# Patient Record
Sex: MALE | Race: WHITE | NOT HISPANIC OR LATINO
[De-identification: names, ages, dates, MRNs, and addresses within clinical notes are randomized per-mention and may not be internally consistent; named-entity substitution may affect disease eponyms.]

---

## 2020-09-29 ENCOUNTER — APPOINTMENT (OUTPATIENT)
Dept: OTOLARYNGOLOGY | Facility: CLINIC | Age: 55
End: 2020-09-29

## 2020-12-20 ENCOUNTER — EMERGENCY (EMERGENCY)
Facility: HOSPITAL | Age: 55
LOS: 0 days | Discharge: HOME | End: 2020-12-20
Attending: STUDENT IN AN ORGANIZED HEALTH CARE EDUCATION/TRAINING PROGRAM
Payer: COMMERCIAL

## 2020-12-20 VITALS
HEART RATE: 77 BPM | OXYGEN SATURATION: 99 % | TEMPERATURE: 98 F | SYSTOLIC BLOOD PRESSURE: 143 MMHG | RESPIRATION RATE: 16 BRPM | DIASTOLIC BLOOD PRESSURE: 66 MMHG

## 2020-12-20 VITALS — WEIGHT: 195.11 LBS

## 2020-12-20 DIAGNOSIS — S32.008A OTHER FRACTURE OF UNSPECIFIED LUMBAR VERTEBRA, INITIAL ENCOUNTER FOR CLOSED FRACTURE: ICD-10-CM
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
## 2020-12-20 DIAGNOSIS — I10 ESSENTIAL (PRIMARY) HYPERTENSION: ICD-10-CM

## 2020-12-20 DIAGNOSIS — S22.31XA FRACTURE OF ONE RIB, RIGHT SIDE, INITIAL ENCOUNTER FOR CLOSED FRACTURE: ICD-10-CM
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
## 2020-12-20 DIAGNOSIS — Z23 ENCOUNTER FOR IMMUNIZATION: ICD-10-CM

## 2020-12-20 DIAGNOSIS — E78.5 HYPERLIPIDEMIA, UNSPECIFIED: ICD-10-CM

## 2020-12-20 DIAGNOSIS — R07.9 CHEST PAIN, UNSPECIFIED: ICD-10-CM

## 2020-12-20 LAB
ALBUMIN SERPL ELPH-MCNC: 3.7 G/DL — SIGNIFICANT CHANGE UP (ref 3.3–5)
ALP SERPL-CCNC: 70 U/L — SIGNIFICANT CHANGE UP (ref 40–120)
ALT FLD-CCNC: 27 U/L — SIGNIFICANT CHANGE UP (ref 12–78)
ANION GAP SERPL CALC-SCNC: 3 MMOL/L — LOW (ref 5–17)
APTT BLD: 31.3 SEC — SIGNIFICANT CHANGE UP (ref 27.5–35.5)
AST SERPL-CCNC: 25 U/L — SIGNIFICANT CHANGE UP (ref 15–37)
BASOPHILS # BLD AUTO: 0.03 K/UL — SIGNIFICANT CHANGE UP (ref 0–0.2)
BASOPHILS NFR BLD AUTO: 0.3 % — SIGNIFICANT CHANGE UP (ref 0–2)
BILIRUB SERPL-MCNC: 0.3 MG/DL — SIGNIFICANT CHANGE UP (ref 0.2–1.2)
BUN SERPL-MCNC: 15 MG/DL — SIGNIFICANT CHANGE UP (ref 7–23)
CALCIUM SERPL-MCNC: 9 MG/DL — SIGNIFICANT CHANGE UP (ref 8.5–10.1)
CHLORIDE SERPL-SCNC: 107 MMOL/L — SIGNIFICANT CHANGE UP (ref 96–108)
CO2 SERPL-SCNC: 30 MMOL/L — SIGNIFICANT CHANGE UP (ref 22–31)
CREAT SERPL-MCNC: 0.8 MG/DL — SIGNIFICANT CHANGE UP (ref 0.5–1.3)
EOSINOPHIL # BLD AUTO: 0.03 K/UL — SIGNIFICANT CHANGE UP (ref 0–0.5)
EOSINOPHIL NFR BLD AUTO: 0.3 % — SIGNIFICANT CHANGE UP (ref 0–6)
GLUCOSE SERPL-MCNC: 139 MG/DL — HIGH (ref 70–99)
HCT VFR BLD CALC: 44.2 % — SIGNIFICANT CHANGE UP (ref 39–50)
HGB BLD-MCNC: 15.2 G/DL — SIGNIFICANT CHANGE UP (ref 13–17)
IMM GRANULOCYTES NFR BLD AUTO: 0.4 % — SIGNIFICANT CHANGE UP (ref 0–1.5)
INR BLD: 1.12 RATIO — SIGNIFICANT CHANGE UP (ref 0.88–1.16)
LYMPHOCYTES # BLD AUTO: 1.56 K/UL — SIGNIFICANT CHANGE UP (ref 1–3.3)
LYMPHOCYTES # BLD AUTO: 16.2 % — SIGNIFICANT CHANGE UP (ref 13–44)
MCHC RBC-ENTMCNC: 31 PG — SIGNIFICANT CHANGE UP (ref 27–34)
MCHC RBC-ENTMCNC: 34.4 GM/DL — SIGNIFICANT CHANGE UP (ref 32–36)
MCV RBC AUTO: 90 FL — SIGNIFICANT CHANGE UP (ref 80–100)
MONOCYTES # BLD AUTO: 0.88 K/UL — SIGNIFICANT CHANGE UP (ref 0–0.9)
MONOCYTES NFR BLD AUTO: 9.1 % — SIGNIFICANT CHANGE UP (ref 2–14)
NEUTROPHILS # BLD AUTO: 7.08 K/UL — SIGNIFICANT CHANGE UP (ref 1.8–7.4)
NEUTROPHILS NFR BLD AUTO: 73.7 % — SIGNIFICANT CHANGE UP (ref 43–77)
PLATELET # BLD AUTO: 230 K/UL — SIGNIFICANT CHANGE UP (ref 150–400)
POTASSIUM SERPL-MCNC: 4.1 MMOL/L — SIGNIFICANT CHANGE UP (ref 3.5–5.3)
POTASSIUM SERPL-SCNC: 4.1 MMOL/L — SIGNIFICANT CHANGE UP (ref 3.5–5.3)
PROT SERPL-MCNC: 7.6 GM/DL — SIGNIFICANT CHANGE UP (ref 6–8.3)
PROTHROM AB SERPL-ACNC: 13 SEC — SIGNIFICANT CHANGE UP (ref 10.6–13.6)
RBC # BLD: 4.91 M/UL — SIGNIFICANT CHANGE UP (ref 4.2–5.8)
RBC # FLD: 12.9 % — SIGNIFICANT CHANGE UP (ref 10.3–14.5)
SARS-COV-2 RNA SPEC QL NAA+PROBE: SIGNIFICANT CHANGE UP
SODIUM SERPL-SCNC: 140 MMOL/L — SIGNIFICANT CHANGE UP (ref 135–145)
WBC # BLD: 9.62 K/UL — SIGNIFICANT CHANGE UP (ref 3.8–10.5)
WBC # FLD AUTO: 9.62 K/UL — SIGNIFICANT CHANGE UP (ref 3.8–10.5)

## 2020-12-20 PROCEDURE — 99285 EMERGENCY DEPT VISIT HI MDM: CPT

## 2020-12-20 PROCEDURE — 86850 RBC ANTIBODY SCREEN: CPT

## 2020-12-20 PROCEDURE — 85610 PROTHROMBIN TIME: CPT

## 2020-12-20 PROCEDURE — 73080 X-RAY EXAM OF ELBOW: CPT | Mod: RT

## 2020-12-20 PROCEDURE — 85730 THROMBOPLASTIN TIME PARTIAL: CPT

## 2020-12-20 PROCEDURE — 74176 CT ABD & PELVIS W/O CONTRAST: CPT

## 2020-12-20 PROCEDURE — 73090 X-RAY EXAM OF FOREARM: CPT | Mod: 26,RT

## 2020-12-20 PROCEDURE — 36415 COLL VENOUS BLD VENIPUNCTURE: CPT

## 2020-12-20 PROCEDURE — 70450 CT HEAD/BRAIN W/O DYE: CPT | Mod: 26

## 2020-12-20 PROCEDURE — 85025 COMPLETE CBC W/AUTO DIFF WBC: CPT

## 2020-12-20 PROCEDURE — U0003: CPT

## 2020-12-20 PROCEDURE — 99244 OFF/OP CNSLTJ NEW/EST MOD 40: CPT

## 2020-12-20 PROCEDURE — 73080 X-RAY EXAM OF ELBOW: CPT | Mod: 26,RT

## 2020-12-20 PROCEDURE — 72125 CT NECK SPINE W/O DYE: CPT

## 2020-12-20 PROCEDURE — 86901 BLOOD TYPING SEROLOGIC RH(D): CPT

## 2020-12-20 PROCEDURE — 93010 ELECTROCARDIOGRAM REPORT: CPT

## 2020-12-20 PROCEDURE — 90715 TDAP VACCINE 7 YRS/> IM: CPT

## 2020-12-20 PROCEDURE — 73060 X-RAY EXAM OF HUMERUS: CPT | Mod: 26,RT

## 2020-12-20 PROCEDURE — 71250 CT THORAX DX C-: CPT

## 2020-12-20 PROCEDURE — 93005 ELECTROCARDIOGRAM TRACING: CPT

## 2020-12-20 PROCEDURE — 99284 EMERGENCY DEPT VISIT MOD MDM: CPT | Mod: 25

## 2020-12-20 PROCEDURE — 70450 CT HEAD/BRAIN W/O DYE: CPT

## 2020-12-20 PROCEDURE — 74176 CT ABD & PELVIS W/O CONTRAST: CPT | Mod: 26

## 2020-12-20 PROCEDURE — 73090 X-RAY EXAM OF FOREARM: CPT | Mod: RT

## 2020-12-20 PROCEDURE — 73060 X-RAY EXAM OF HUMERUS: CPT | Mod: RT

## 2020-12-20 PROCEDURE — 71250 CT THORAX DX C-: CPT | Mod: 26

## 2020-12-20 PROCEDURE — 90471 IMMUNIZATION ADMIN: CPT

## 2020-12-20 PROCEDURE — 86900 BLOOD TYPING SEROLOGIC ABO: CPT

## 2020-12-20 PROCEDURE — 80053 COMPREHEN METABOLIC PANEL: CPT

## 2020-12-20 PROCEDURE — 72125 CT NECK SPINE W/O DYE: CPT | Mod: 26

## 2020-12-20 RX ORDER — LIDOCAINE 4 G/100G
1 CREAM TOPICAL
Qty: 10 | Refills: 0
Start: 2020-12-20 | End: 2020-12-29

## 2020-12-20 RX ORDER — TETANUS TOXOID, REDUCED DIPHTHERIA TOXOID AND ACELLULAR PERTUSSIS VACCINE, ADSORBED 5; 2.5; 8; 8; 2.5 [IU]/.5ML; [IU]/.5ML; UG/.5ML; UG/.5ML; UG/.5ML
0.5 SUSPENSION INTRAMUSCULAR ONCE
Refills: 0 | Status: COMPLETED | OUTPATIENT
Start: 2020-12-20 | End: 2020-12-20

## 2020-12-20 RX ORDER — OXYCODONE AND ACETAMINOPHEN 5; 325 MG/1; MG/1
1 TABLET ORAL
Qty: 8 | Refills: 0
Start: 2020-12-20 | End: 2020-12-21

## 2020-12-20 RX ORDER — OXYCODONE HYDROCHLORIDE 5 MG/1
5 TABLET ORAL ONCE
Refills: 0 | Status: DISCONTINUED | OUTPATIENT
Start: 2020-12-20 | End: 2020-12-20

## 2020-12-20 RX ORDER — ACETAMINOPHEN 500 MG
975 TABLET ORAL ONCE
Refills: 0 | Status: COMPLETED | OUTPATIENT
Start: 2020-12-20 | End: 2020-12-20

## 2020-12-20 RX ADMIN — OXYCODONE HYDROCHLORIDE 5 MILLIGRAM(S): 5 TABLET ORAL at 16:15

## 2020-12-20 RX ADMIN — TETANUS TOXOID, REDUCED DIPHTHERIA TOXOID AND ACELLULAR PERTUSSIS VACCINE, ADSORBED 0.5 MILLILITER(S): 5; 2.5; 8; 8; 2.5 SUSPENSION INTRAMUSCULAR at 14:18

## 2020-12-20 RX ADMIN — Medication 975 MILLIGRAM(S): at 10:09

## 2020-12-20 RX ADMIN — Medication 975 MILLIGRAM(S): at 08:53

## 2020-12-20 NOTE — ED ADULT NURSE NOTE - CHIEF COMPLAINT QUOTE
Pt complains of right-sided posterior rib and right arm pain after trip and fall down 12 steps within past hour.  Pt denies head injury, denies LOC, takes no anticoagulants.  No acute distress on arrival.

## 2020-12-20 NOTE — ED PROVIDER NOTE - PATIENT PORTAL LINK FT
You can access the FollowMyHealth Patient Portal offered by [facility] by registering at the following website: [email or web address]. By joining [employer]’s FollowMyHealth portal, you will also be able to view your health information using other applications (apps) compatible with our system.

## 2020-12-20 NOTE — CONSULT NOTE ADULT - SUBJECTIVE AND OBJECTIVE BOX
GCS 15    54 yo male s/p mechanical fall down stairs. Patient slide down stairs on back and braced with arms. Denies LOC. Patient admits to pain along the right side of chest and lower back. Pain controlled in ED with Tylenol. Denies any SOB. Patient is ambulatory. Denies any headaches, weakness, or loss of sensation to lower extremities.     PMH: DM  PSH: Right finger  NKDA    Vital Signs Last 24 Hrs  T(C): 36.9 (20 Dec 2020 11:38), Max: 37 (20 Dec 2020 08:37)  T(F): 98.4 (20 Dec 2020 11:38), Max: 98.6 (20 Dec 2020 08:37)  HR: 75 (20 Dec 2020 11:38) (75 - 88)  BP: 139/73 (20 Dec 2020 11:38) (139/73 - 161/69)  BP(mean): 88 (20 Dec 2020 11:38) (88 - 89)  RR: 16 (20 Dec 2020 11:38) (16 - 20)  SpO2: 100% (20 Dec 2020 11:38) (98% - 100%)    Gen: AAOx3 in NAD  HEENT: NCAT  Cervical: No cervical tenderness, FROM  Chest: Right lateral chest pain over 11th rib, no gross injuries  Abd: Soft, NTND, no injuries  Back: No stepoffs or injuries noted  Ext: FROM of all extremities.   Vasc: Palpable PT and radial pulses distally                          15.2   9.62  )-----------( 230      ( 20 Dec 2020 11:03 )             44.2     Imaging:  < from: CT Abdomen and Pelvis No Cont (12.20.20 @ 10:05) >  IMPRESSION: Right transverse process fractures of L1-L4. Displaced right posterior 11th rib fracture noted  No evidence of pneumothorax or visceral injury  Atherosclerotic calcification more than expected for the stated age    < end of copied text >  < from: CT Cervical Spine No Cont (12.20.20 @ 09:33) >  IMPRESSION:    There is no skull fracture, intracranial hemorrhage or mass effect.    Straightening of the usual cervical lordosis without fracture or malalignment.    < end of copied text >  
This is a 56 yo male s/p mechanical fall down stairs. Patient slid down stairs on back and braced with arms. Denies LOC. Patient admits to pain along the right side of chest and lower back.  the lower back pain does not radiate down his legs and there is no associated numbness or weakness of extremities.  Pain controlled in ED with Tylenol. Denies any SOB. Patient is ambulatory. Denies any headaches, weakness, or loss of sensation to lower extremities.     Vital Signs Last 24 Hrs  T(C): 36.8 (20 Dec 2020 15:44), Max: 37 (20 Dec 2020 08:37)  T(F): 98.2 (20 Dec 2020 15:44), Max: 98.6 (20 Dec 2020 08:37)  HR: 77 (20 Dec 2020 15:44) (75 - 88)  BP: 143/66 (20 Dec 2020 15:44) (139/73 - 161/69)  BP(mean): 88 (20 Dec 2020 11:38) (88 - 89)  RR: 16 (20 Dec 2020 15:44) (16 - 20)  SpO2: 99% (20 Dec 2020 15:44) (98% - 100%)                        15.2   9.62  )-----------( 230      ( 20 Dec 2020 11:03 )             44.2   12-20    140  |  107  |  15  ----------------------------<  139<H>  4.1   |  30  |  0.80    Ca    9.0      20 Dec 2020 11:03    TPro  7.6  /  Alb  3.7  /  TBili  0.3  /  DBili  x   /  AST  25  /  ALT  27  /  AlkPhos  70  12-20    Home Medications:  none  PAST MEDICAL & SURGICAL HISTORY:  denies    < from: CT Chest No Cont (12.20.20 @ 10:05) >    EXAM:  CT ABDOMEN AND PELVIS                          EXAM:  CT CHEST                            PROCEDURE DATE:  12/20/2020          INTERPRETATION:  CLINICAL INFORMATION: Right-sided rib and back pain status post fall    COMPARISON: None.    PROCEDURE:  CT of the Chest, Abdomen and Pelvis was performed without intravenous contrast.  Intravenous contrast: None.  Oral contrast: None.  Sagittal and coronal reformats were performed.    FINDINGS:  CHEST:  LUNGS AND LARGE AIRWAYS: Patent central airways. No pulmonary nodules.  PLEURA: No pleural effusion.  VESSELS: Coronary artery calcification is appreciated  HEART: Heart size is normal. No pericardial effusion.  MEDIASTINUM AND BERNARD: No lymphadenopathy.  CHEST WALL AND LOWER NECK: Within normal limits.    ABDOMEN AND PELVIS:  LIVER: Within normal limits.  BILE DUCTS: Normal caliber.  GALLBLADDER: Within normal limits.  SPLEEN: Within normal limits.  PANCREAS: Within normal limits.  ADRENALS: Within normal limits.  KIDNEYS/URETERS: Within normal limits.    BLADDER: Within normal limits.  REPRODUCTIVE ORGANS: Prostate within normal limits.    BOWEL: No bowel obstruction. Appendix is normal.  PERITONEUM: No ascites.  VESSELS: Atherosclerotic changes.  RETROPERITONEUM/LYMPH NODES: No lymphadenopathy.  ABDOMINAL WALL: Within normal limits.  BONES: Mildly displaced fracture of the right transverse processes of L1-L4. Displaced right 11th posterior rib fracture is also seen    IMPRESSION: Right transverse process fractures of L1-L4. Displaced right posterior 11th rib fracture noted  No evidence of pneumothorax or visceral injury  Atherosclerotic calcification more than expected for the stated age    JACOB NEGRETE MD; Attending Radiologist  This document has been electronically signed. Dec 20 2020 10:41AM    < end of copied text >    Physical Exam:  Constitutional: Awake / alert  HEENT: PERRLA, EOMI  Neck: Supple  Respiratory: Breath sounds are clear bilaterally  Cardiovascular: S1 and S2, regular rhythm  Gastrointestinal: Soft, NT/ND  Extremities:  no edema  +point tenderness to lower back  Musculoskeletal: no joint swelling/tenderness, no abnormal movements  Skin: No rashes    Neurological Exam:  HF: A x O x 3, appropriately interactive, normal affect, speech fluent, no aphasia or paraphasic errors. Naming /repetition intact   CN: PERRL, EOMI, VFF, facial sensation normal, no NLFD, tongue midline  Motor: No pronator drift, Strength 5/5 in all 4 ext, normal bulk and tone, no tremor, rigidity or bradykinesia  Sens: Intact to light touch  Reflexes: Symmetric and normal, downgoing toes b/l  Coord:  No FNFA, dysmetria, MONICA intact   Gait/Balance: Cannot test due to pain  GCS 15

## 2020-12-20 NOTE — ED PROVIDER NOTE - CARE PROVIDER_API CALL
Eliel Denton  NEUROSURGERY  [address], [address] [place] [address]  Phone: [phone]  Fax: [phone]  Follow Up Time:

## 2020-12-20 NOTE — ED PROVIDER NOTE - NSFOLLOWUPINSTRUCTIONS_ED_ALL_ED_FT
Transverse Process Fracture       Bones of the spine (vertebrae) have portions that extend off to either side of the spine. These portions of bone are called transverse processes. A transverse process fracture, which is also called a rotation spine fracture, is a break in a transverse process.      What are the causes?  This condition may be caused by:  •A fall from a great height.      •A car accident.      •A sports injury.      •A gunshot wound.      •A hard, direct hit to the back.      This kind of fracture often results from a sudden and severe bending of the spine to one side. Depending on the cause of the fracture, one or more bones may be affected.      What increases the risk?  You are more likely to develop this condition if:  •You have thinning and loss of density in the bones (osteoporosis).      •You play a contact sport.        What are the signs or symptoms?  The main symptom of this condition is back pain. The pain may:  •Be felt on the side of the spine (flank) where the fracture is.      •Get worse when you move or take a deep breath.        How is this diagnosed?  This condition may be diagnosed based on:  •Your symptoms.      •Your medical history.      •A physical exam.    You may also have other tests, including:  •X-rays.      •A CT scan.      •MRI.        How is this treated?  Most transverse process fractures heal on their own with time and rest. Treatment may involve supportive care, such as:  •Limiting activity.    •Medicines, such as:  •Pain medicine.      •Muscle-relaxing medicine.        •Physical therapy.      •A neck or back brace.        Follow these instructions at home:    If you have a brace:     •Wear the neck or back brace as told by your health care provider. Remove it only as told by your health care provider.      •Keep the brace clean.     •If the brace is not waterproof:   •Do not let it get wet.      •Cover it with a watertight covering when you take a bath or a shower.          Managing pain, stiffness, and swelling    •If directed, put ice on the injured area:  •If you have a removable brace, remove it as told by your health care provider.      •Put ice in a plastic bag.      •Place a towel between your skin and the bag.      •Leave the ice on for 20 minutes, 2–3 times a day.        Medicines     •Take over-the-counter and prescription medicines only as told by your health care provider.      • Do not drive or use heavy machinery while taking prescription pain medicine.    •If you are taking prescription pain medicine, take actions to prevent or treat constipation. Your health care provider may recommend that you:  •Drink enough fluid to keep your urine pale yellow.      •Eat foods that are high in fiber, such as fresh fruits and vegetables, whole grains, and beans.      •Limit foods that are high in fat and processed sugars, such as fried or sweet foods.      •Take an over-the-counter or prescription medicine for constipation.        Activity   •Stay in bed (on bed rest) only as directed by your health care provider.  •Avoid being in bed for a long time without moving. Get up to take short walks every 1–2 hours. This is important to improve blood flow and breathing. Ask for help if you feel weak or unsteady.        •Return to your normal activities when your health care provider says it is okay. Ask if there are any activities that you should not do.      •Do physical therapy exercises as recommended by your health care provider.      General instructions     • Do not use any products that contain nicotine or tobacco, such as cigarettes and e-cigarettes. These can delay bone healing. If you need help quitting, ask your health care provider.      •Keep all follow-up visits as told by your health care provider. This is important. Visits can help to prevent permanent injury, disability, and long-lasting (chronic) pain.        Contact a health care provider if:    •You have a fever.      •You develop a cough that makes your pain worse.      •Your pain medicine is not helping.      •Your pain does not get better over time.      •You cannot return to your normal activities as planned or expected.        Get help right away if:    •Your pain is very bad and it suddenly gets worse.      •You are unable to move any body part (paralysis) that is below the level of your injury.      •You have numbness, tingling, or weakness in any body part that is below the level of your injury.      •You cannot control your bladder or bowels.        Summary    •A transverse process fracture is a break in the portion of the bone that extends to the side of the spine.      •Most transverse process fractures heal on their own with time and rest.      •You may also have supportive treatments such as a back brace, pain medicines, and physical therapy.      •Keep all follow-up visits. This is important and will help to prevent permanent injury, disability, and long-lasting (chronic) pain.      This information is not intended to replace advice given to you by your health care provider. Make sure you discuss any questions you have with your health care provider.      Rib Fracture    WHAT YOU NEED TO KNOW:    A rib fracture is a crack or break in a rib bone. Rib fractures usually heal within 6 weeks. You should be able to return to your usual activities before that time.     Rib Cage         DISCHARGE INSTRUCTIONS:    Call your local emergency number (911 in the ) if:   •You have trouble breathing.       •You have new or increased pain.      Seek care immediately if:   •Your pain does not get better, even after treatment.      •You have a fever or a cough.       Call your doctor if:   •You have questions or concerns about your condition or care.           Medicines: You may need any of the following:   •NSAIDs, such as ibuprofen, help decrease swelling, pain, and fever. This medicine is available with or without a doctor's order. NSAIDs can cause stomach bleeding or kidney problems in certain people. If you take blood thinner medicine, always ask your healthcare provider if NSAIDs are safe for you. Always read the medicine label and follow directions.      •Prescription pain medicine may be given. Ask your healthcare provider how to take this medicine safely. Some prescription pain medicines contain acetaminophen. Do not take other medicines that contain acetaminophen without talking to your healthcare provider. Too much acetaminophen may cause liver damage. Prescription pain medicine may cause constipation. Ask your healthcare provider how to prevent or treat constipation.       •Take your medicine as directed. Contact your healthcare provider if you think your medicine is not helping or if you have side effects. Tell him or her if you are allergic to any medicine. Keep a list of the medicines, vitamins, and herbs you take. Include the amounts, and when and why you take them. Bring the list or the pill bottles to follow-up visits. Carry your medicine list with you in case of an emergency.      Self-care:   •Take deep breaths and cough 10 times each hour. This will decrease your risk for a lung infection. Hug a pillow on your injured side to decrease pain while you take deep breaths. Take a deep breath and hold it for as long as you can. Let the air out and then cough. Deep breaths help open your airway. You may be given an incentive spirometer to help you take deep breaths. Put the plastic piece in your mouth and take a slow, deep breath, then let the air out and cough. Repeat these steps 10 times every hour.       •Rest and limit activity as directed. Do not pull, push, or lift objects. Start to do more as your pain decreases. Ask your healthcare provider how much activity you can do.      •Apply ice on your chest near your fractured rib for 15 to 20 minutes every hour or as directed. Use an ice pack, or put crushed ice in a plastic bag. Cover it with a towel. Ice helps prevent tissue damage and decreases swelling and pain.      Follow up with your doctor as directed: Write down your questions so you remember to ask them during your visits.

## 2020-12-20 NOTE — ED PROVIDER NOTE - SKIN, MLM
Skin normal color for race, warm, dry. No evidence of rash. +abrasions to volar aspect of R forearm, full ROM. +abrasions to volar aspect of L wrist.

## 2020-12-20 NOTE — ED PROVIDER NOTE - CARE PLAN
Principal Discharge DX:	Fractures, multiple   Principal Discharge DX:	Rib fracture  Secondary Diagnosis:	Back injury

## 2020-12-20 NOTE — ED ADULT NURSE NOTE - OBJECTIVE STATEMENT
Pt complains of right-sided posterior rib and right arm pain after trip and fall down 12 steps within past hour.  pt states he had socks on and slipped backward. Pt denies head injury, denies LOC, takes no anticoagulants.

## 2020-12-20 NOTE — ED ADULT TRIAGE NOTE - CHIEF COMPLAINT QUOTE
Pt complains of right-sided posterior rib and right arm pain after trip and fall down 12 steps.  Pt denies head injury, denies LOC, takes no anticoagulants. Pt complains of right-sided posterior rib and right arm pain after trip and fall down 12 steps within past hour.  Pt denies head injury, denies LOC, takes no anticoagulants.  No acute distress on arrival.

## 2020-12-20 NOTE — ED ADULT NURSE REASSESSMENT NOTE - NS ED NURSE REASSESS COMMENT FT1
pt received in bed, alert and oriented x4. Pt resting comfortably in bed at this time, pending results of CT scan/xray at this time. Safety maintained

## 2020-12-20 NOTE — ED PROVIDER NOTE - MUSCULOSKELETAL, MLM
Spine appears normal. Range of motion is not limited. +TTP at the R posterior lateral thoracic area and R flank. No abrasions or ecchymosis noted.

## 2020-12-20 NOTE — ED PROVIDER NOTE - CLINICAL SUMMARY MEDICAL DECISION MAKING FREE TEXT BOX
55 M presents to the ED s/p mechanical fall. Plan: EKG, XR R elbow, forearm and humerus, CT head, C-spine, A/P, pain control and reevaluate.

## 2020-12-20 NOTE — ED PROVIDER NOTE - OBJECTIVE STATEMENT
56 y/o M with PMHx of HTN, HLD presenting to the ED s/p mechanical fall down stairs this AM. Patient reports that he was at the top of the stairs wearing socks when he slipped, falling down approx. x12 wooden steps onto his R side. No head injury, no LOC. Patient is not on any blood thinners.  Currently c/o RUE pain, localized at R elbow, R upper back pain and chest wall pain. Patient was helped up by wife and came to the ED for further evaluation. Pt was not on the ground for extended period of time. No medications taken PTA. Denies any other pain/complaints at this time.

## 2020-12-20 NOTE — ED PROVIDER NOTE - PROGRESS NOTE DETAILS
Sonia DURAND for ED attending, Dr. Jackson: Spoke to Dr. Denton service on call for ortho spine, pt with transverse process fx L1-L4, will come down to evaluate patient. Requesting trauma consult at this time. Sonia DURAND for ED attending, Dr. Jackson: Spoke to Dr. Hughes on call for trauma, will come down to evaluate patient. Manuel ORTIZ: cleared by trauma; cleared by NS team; to f/u with Dr. Denton as outpatient; strict return precautions given.

## 2020-12-20 NOTE — CONSULT NOTE ADULT - ASSESSMENT
56 yo male s/p fall with multiple lumbar transverse process fractures and right 11th rib fracture.    -Patient is cleared from trauma surgery for discharge home pending Neurosurgery clearance of spine  -All imaging reviewed  -Pain control with oral medications and lidocaine patch  -Incentive spirometer use  -Plan discussed with patient understanding of plan    Discussed with trauma surgery attending, Dr. Hughes 
AP  transverse process fractures of lumbar spine  No acute neurosurgical intervention at this time  recommend conservative measures, multi modal pain regimen  Dr Denton discussed plan w patient who understands and agrees  patient is cleared from neurosurgery standpoint, if cleared by trauma  plan also  ED team

## 2020-12-21 ENCOUNTER — TRANSCRIPTION ENCOUNTER (OUTPATIENT)
Age: 55
End: 2020-12-21

## 2021-08-09 ENCOUNTER — NON-APPOINTMENT (OUTPATIENT)
Age: 56
End: 2021-08-09

## 2021-08-09 PROBLEM — E78.5 HYPERLIPIDEMIA, UNSPECIFIED: Chronic | Status: ACTIVE | Noted: 2020-12-20

## 2021-08-09 PROBLEM — I10 ESSENTIAL (PRIMARY) HYPERTENSION: Chronic | Status: ACTIVE | Noted: 2020-12-20

## 2021-09-28 ENCOUNTER — APPOINTMENT (OUTPATIENT)
Dept: DERMATOLOGY | Facility: CLINIC | Age: 56
End: 2021-09-28
Payer: COMMERCIAL

## 2021-09-28 ENCOUNTER — NON-APPOINTMENT (OUTPATIENT)
Age: 56
End: 2021-09-28

## 2021-09-28 DIAGNOSIS — Z12.83 ENCOUNTER FOR SCREENING FOR MALIGNANT NEOPLASM OF SKIN: ICD-10-CM

## 2021-09-28 DIAGNOSIS — Z86.39 PERSONAL HISTORY OF OTHER ENDOCRINE, NUTRITIONAL AND METABOLIC DISEASE: ICD-10-CM

## 2021-09-28 DIAGNOSIS — L81.4 OTHER MELANIN HYPERPIGMENTATION: ICD-10-CM

## 2021-09-28 DIAGNOSIS — L57.0 ACTINIC KERATOSIS: ICD-10-CM

## 2021-09-28 DIAGNOSIS — D22.9 MELANOCYTIC NEVI, UNSPECIFIED: ICD-10-CM

## 2021-09-28 PROCEDURE — 11103 TANGNTL BX SKIN EA SEP/ADDL: CPT

## 2021-09-28 PROCEDURE — 11102 TANGNTL BX SKIN SINGLE LES: CPT

## 2021-09-28 PROCEDURE — 99203 OFFICE O/P NEW LOW 30 MIN: CPT | Mod: 25

## 2021-09-28 NOTE — PHYSICAL EXAM
[FreeTextEntry3] : AAOx3, pleasant, NAD, no visual lymphadenopathy\par hair, scalp, face, nose, eyelids, ears, lips, oropharynx, neck, chest, abdomen, back, right arm, left arm, nails, and hands examined with all normal findings,\par pertinent findings include:\par \par multiple benign nevi and lentigines\par scaling erythematous papule on nasal bridge\par + inflamed, waxy, keratotic papule on left forearm and lower back

## 2021-09-28 NOTE — ASSESSMENT
[FreeTextEntry1] : 1) benign findings as above- education\par \par 2) Shave bx location x2; left forearm, lower back\par diagnosis: r/o ISK\par  \par Shave biopsy performed today over above location, risks and benefits discussed including incomplete removal, not enough tissue for diagnosis scarring and infection, informed consent obtained, pictures taken,  cleaned with alcohol and anesthetized with 1%lido+epi, 0.3 cc total, hemostasis obtained with monsels, vaseline and bandaid placed, tolerated well, wound care reviewed, specimen sent to pathology.\par \par 3) Actinic keratoses as above\par -Treated with cryotherapy x 2, side effects discussed including erythema and scarring, blister formation expected by patient, total freeze time 4 seconds. Wound care reviewed withpatient. Risk of hypo/hyperpigmentation reviewed with patient, and possible need for re-treatment and / or future biopsy also reviewed with patient\par - total # treated- 1

## 2021-09-28 NOTE — HISTORY OF PRESENT ILLNESS
[FreeTextEntry1] : skin check [de-identified] : 56 year old male here for skin check. new growths which have been enlarging and itching on forearm and back.\par

## 2021-10-04 LAB — CORE LAB BIOPSY: NORMAL

## 2022-09-12 ENCOUNTER — APPOINTMENT (OUTPATIENT)
Dept: DERMATOLOGY | Facility: CLINIC | Age: 57
End: 2022-09-12

## 2023-03-20 ENCOUNTER — OFFICE (OUTPATIENT)
Dept: URBAN - METROPOLITAN AREA CLINIC 102 | Facility: CLINIC | Age: 58
Setting detail: OPHTHALMOLOGY
End: 2023-03-20
Payer: COMMERCIAL

## 2023-03-20 DIAGNOSIS — Z96.1: ICD-10-CM

## 2023-03-20 DIAGNOSIS — H16.223: ICD-10-CM

## 2023-03-20 DIAGNOSIS — H18.832: ICD-10-CM

## 2023-03-20 DIAGNOSIS — E10.3513: ICD-10-CM

## 2023-03-20 DIAGNOSIS — H26.493: ICD-10-CM

## 2023-03-20 PROCEDURE — 92250 FUNDUS PHOTOGRAPHY W/I&R: CPT | Performed by: OPHTHALMOLOGY

## 2023-03-20 PROCEDURE — 92004 COMPRE OPH EXAM NEW PT 1/>: CPT | Performed by: OPHTHALMOLOGY

## 2023-03-20 ASSESSMENT — SPHEQUIV_DERIVED
OS_SPHEQUIV: -1.125
OS_SPHEQUIV: 0
OD_SPHEQUIV: 0.375
OD_SPHEQUIV: 0.125

## 2023-03-20 ASSESSMENT — KERATOMETRY
OS_AXISANGLE_DEGREES: 169
OD_K2POWER_DIOPTERS: 45.00
OS_K2POWER_DIOPTERS: 45.00
OD_AXISANGLE_DEGREES: 008
OS_K1POWER_DIOPTERS: 43.25
OD_K1POWER_DIOPTERS: 43.25

## 2023-03-20 ASSESSMENT — AXIALLENGTH_DERIVED
OS_AL: 23.8025
OD_AL: 23.2221
OS_AL: 23.3645
OD_AL: 23.3169

## 2023-03-20 ASSESSMENT — REFRACTION_MANIFEST
OS_SPHERE: -0.50
OS_CYLINDER: -1.25
OS_VA1: 20/40
OD_SPHERE: +0.75
OD_AXIS: 081
OD_CYLINDER: -1.25
OS_AXIS: 099

## 2023-03-20 ASSESSMENT — CONFRONTATIONAL VISUAL FIELD TEST (CVF)
OS_FINDINGS: FULL
OD_FINDINGS: FULL

## 2023-03-20 ASSESSMENT — REFRACTION_AUTOREFRACTION
OS_AXIS: 95
OD_AXIS: 83
OD_SPHERE: +1.00
OS_SPHERE: +0.50
OS_CYLINDER: -1.00
OD_CYLINDER: -1.25

## 2023-03-20 ASSESSMENT — REFRACTION_CURRENTRX
OS_SPHERE: +2.00
OD_OVR_VA: 20/
OS_AXIS: 0
OS_CYLINDER: SPHERE
OD_VPRISM_DIRECTION: SV
OS_VPRISM_DIRECTION: SV
OS_OVR_VA: 20/
OD_AXIS: 0
OD_SPHERE: +2.00
OD_CYLINDER: SPHERE

## 2023-03-20 ASSESSMENT — VISUAL ACUITY
OD_BCVA: 20/30
OS_BCVA: 20/25

## 2023-03-20 ASSESSMENT — TONOMETRY
OD_IOP_MMHG: 11
OS_IOP_MMHG: 11

## 2023-07-18 NOTE — ED ADULT NURSE NOTE - CHIEF COMPLAINT
The patient is a 55y Male complaining of fall down stairs. Oriented - self; Oriented - place; Oriented - time

## 2023-12-27 ENCOUNTER — EMERGENCY (EMERGENCY)
Facility: HOSPITAL | Age: 58
LOS: 0 days | Discharge: HOME | End: 2023-12-28
Attending: STUDENT IN AN ORGANIZED HEALTH CARE EDUCATION/TRAINING PROGRAM
Payer: COMMERCIAL

## 2023-12-27 VITALS
TEMPERATURE: 98 F | HEIGHT: 70 IN | SYSTOLIC BLOOD PRESSURE: 147 MMHG | RESPIRATION RATE: 18 BRPM | OXYGEN SATURATION: 97 % | HEART RATE: 79 BPM | DIASTOLIC BLOOD PRESSURE: 65 MMHG | WEIGHT: 195.11 LBS

## 2023-12-27 DIAGNOSIS — M54.50 LOW BACK PAIN, UNSPECIFIED: ICD-10-CM

## 2023-12-27 DIAGNOSIS — M54.89 OTHER DORSALGIA: ICD-10-CM

## 2023-12-27 DIAGNOSIS — E10.9 TYPE 1 DIABETES MELLITUS WITHOUT COMPLICATIONS: ICD-10-CM

## 2023-12-27 PROCEDURE — 99284 EMERGENCY DEPT VISIT MOD MDM: CPT

## 2023-12-27 PROCEDURE — 99283 EMERGENCY DEPT VISIT LOW MDM: CPT

## 2023-12-27 RX ORDER — DICLOFENAC SODIUM 75 MG/1
1 TABLET, DELAYED RELEASE ORAL
Refills: 0 | DISCHARGE
Start: 2023-12-27

## 2023-12-27 RX ORDER — METHOCARBAMOL 500 MG/1
2 TABLET, FILM COATED ORAL
Refills: 0 | DISCHARGE
Start: 2023-12-27

## 2023-12-27 NOTE — ED STATDOCS - PROGRESS NOTE DETAILS
back pain for 2 weeks. no red flags for cauda equina. ambulating. no neuro deficits. no fever. normal virals.

## 2023-12-27 NOTE — ED STATDOCS - PATIENT PORTAL LINK FT
You can access the FollowMyHealth Patient Portal offered by [facility] by registering at the following website: [email or web address]. By joining [employer]’s FollowMyHealth portal, you will also be able to view your health information using other applications (apps) compatible with our system. You can access the FollowMyHealth Patient Portal offered by [facility] by registering at the following website: [email or web address]. By joining [employer]’s FollowMyHealth portal, you will also be able to view your health information using other applications (apps) compatible with our system.

## 2023-12-27 NOTE — ED STATDOCS - NSFOLLOWUPINSTRUCTIONS_ED_ALL_ED_FT
Seek immediate medical assistance for any new or worsening symptoms. If you have issues obtaining follow up, please call: [phone] or [phone]  to obtain a doctor or specialist who takes your insurance in your area.     Take diclofenac, methocarbamol, tylenol for pain.     Follow up with SPINE. Seek immediate medical assistance for any new or worsening symptoms. If you have issues obtaining follow up, please call: [phone] or [phone]  to obtain a doctor or specialist who takes your insurance in your area.     Take diclofenac, methocarbamol, tylenol for pain.     Follow up with SPINE.

## 2023-12-27 NOTE — ED STATDOCS - OBJECTIVE STATEMENT
58-year male coming in with 2 weeks of lower back pain now radiating up to his upper back.  He feels like it is burning in nature.  He is a type I diabetic but his sugars are controlled.  No fevers chills IV drug use history urinary or bladder or bowel incontinence or saddle anesthesia.  No falls or trauma.  No weakness or numbness in the upper extremities.  No neck pain.  No headache.  Compliant with all medications.

## 2023-12-27 NOTE — ED STATDOCS - CARE PROVIDER_API CALL
Jasmeet Marte  Orthopaedic Surgery  [address] [place] [id number]  Phone: [phone]  Fax: [phone]  Follow Up Time: 1-3 Days   Jasmeet Marte  Orthopaedic Surgery  [address] [place] [id number]  Phone: [phone]  Fax: [phone]  Follow Up Time: 1-3 Days

## 2023-12-27 NOTE — ED STATDOCS - CLINICAL SUMMARY MEDICAL DECISION MAKING FREE TEXT BOX
adult male here with 2 weeks of back pain.  Was prescribed diclofenac and methocarbamol today but has only taken 1 dose of it.  No red flags or cauda equina.  Vitals are normal neuro intact.  Will DC with spine follow-up.

## 2023-12-27 NOTE — ED ADULT TRIAGE NOTE - CHIEF COMPLAINT QUOTE
Patient ambulatory to the ER c/o back pain and headache for 6 days. Patient reports going to urgent care for the pain; was prescribed diclofenac sodium and flexeril without relief. - blood thinners, - allergies. A&Ox4 and no signs and symptoms of distress. Denies any recent traumas or falls

## 2023-12-28 VITALS
HEART RATE: 89 BPM | TEMPERATURE: 98 F | DIASTOLIC BLOOD PRESSURE: 75 MMHG | OXYGEN SATURATION: 98 % | SYSTOLIC BLOOD PRESSURE: 132 MMHG | RESPIRATION RATE: 20 BRPM

## 2023-12-28 NOTE — ED ADULT NURSE NOTE - NSFALLUNIVINTERV_ED_ALL_ED
Bed/Stretcher in lowest position, wheels locked, appropriate side rails in place/Call bell, personal items and telephone in reach/Instruct patient to call for assistance before getting out of bed/chair/stretcher/Non-slip footwear applied when patient is off stretcher/[place] to call system/Physically safe environment - no spills, clutter or unnecessary equipment/Purposeful proactive rounding/Room/bathroom lighting operational, light cord in reach Bed/Stretcher in lowest position, wheels locked, appropriate side rails in place/Call bell, personal items and telephone in reach/Instruct patient to call for assistance before getting out of bed/chair/stretcher/Non-slip footwear applied when patient is off stretcher/[place] to call system/Physically safe environment - no spills, clutter or unnecessary equipment/Purposeful proactive rounding/Room/bathroom lighting operational, light cord in reach

## 2023-12-28 NOTE — ED ADULT NURSE NOTE - OBJECTIVE STATEMENT
Ambulatory to ER with c/o back pain. A & ox4, respirations even and unlabored on room air. Await MD bey.

## 2024-01-02 ENCOUNTER — INPATIENT (INPATIENT)
Facility: HOSPITAL | Age: 59
LOS: 5 days | Discharge: HOME HEALTH | DRG: 867 | End: 2024-01-08
Attending: STUDENT IN AN ORGANIZED HEALTH CARE EDUCATION/TRAINING PROGRAM | Admitting: INTERNAL MEDICINE
Payer: COMMERCIAL

## 2024-01-02 VITALS — WEIGHT: 195.11 LBS | HEIGHT: 70 IN

## 2024-01-02 DIAGNOSIS — Z96.41 PRESENCE OF INSULIN PUMP (EXTERNAL) (INTERNAL): ICD-10-CM

## 2024-01-02 DIAGNOSIS — Z79.4 LONG TERM (CURRENT) USE OF INSULIN: ICD-10-CM

## 2024-01-02 DIAGNOSIS — I10 ESSENTIAL (PRIMARY) HYPERTENSION: ICD-10-CM

## 2024-01-02 DIAGNOSIS — A69.21 MENINGITIS DUE TO LYME DISEASE: ICD-10-CM

## 2024-01-02 DIAGNOSIS — E10.9 TYPE 1 DIABETES MELLITUS WITHOUT COMPLICATIONS: ICD-10-CM

## 2024-01-02 DIAGNOSIS — M54.2 CERVICALGIA: ICD-10-CM

## 2024-01-02 DIAGNOSIS — S05.01XA INJURY OF CONJUNCTIVA AND CORNEAL ABRASION WITHOUT FOREIGN BODY, RIGHT EYE, INITIAL ENCOUNTER: ICD-10-CM

## 2024-01-02 DIAGNOSIS — G89.29 OTHER CHRONIC PAIN: ICD-10-CM
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
## 2024-01-02 DIAGNOSIS — S05.02XA INJURY OF CONJUNCTIVA AND CORNEAL ABRASION WITHOUT FOREIGN BODY, LEFT EYE, INITIAL ENCOUNTER: ICD-10-CM

## 2024-01-02 DIAGNOSIS — S69.91XA UNSPECIFIED INJURY OF RIGHT WRIST, HAND AND FINGER(S), INITIAL ENCOUNTER: Chronic | ICD-10-CM

## 2024-01-02 DIAGNOSIS — R47.1 DYSARTHRIA AND ANARTHRIA: ICD-10-CM

## 2024-01-02 DIAGNOSIS — U07.1 COVID-19: ICD-10-CM
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
## 2024-01-02 DIAGNOSIS — G51.0 BELL'S PALSY: ICD-10-CM

## 2024-01-02 DIAGNOSIS — H04.123 DRY EYE SYNDROME OF BILATERAL LACRIMAL GLANDS: ICD-10-CM

## 2024-01-02 DIAGNOSIS — Z79.890 HORMONE REPLACEMENT THERAPY: ICD-10-CM

## 2024-01-02 DIAGNOSIS — E03.9 HYPOTHYROIDISM, UNSPECIFIED: ICD-10-CM

## 2024-01-02 DIAGNOSIS — I63.9 CEREBRAL INFARCTION, UNSPECIFIED: ICD-10-CM

## 2024-01-02 LAB
ALBUMIN SERPL ELPH-MCNC: 3.6 G/DL — SIGNIFICANT CHANGE UP (ref 3.3–5)
ALBUMIN SERPL ELPH-MCNC: 3.6 G/DL — SIGNIFICANT CHANGE UP (ref 3.3–5)
ALP SERPL-CCNC: 72 U/L — SIGNIFICANT CHANGE UP (ref 40–120)
ALP SERPL-CCNC: 72 U/L — SIGNIFICANT CHANGE UP (ref 40–120)
ALT FLD-CCNC: 35 U/L — SIGNIFICANT CHANGE UP (ref 12–78)
ALT FLD-CCNC: 35 U/L — SIGNIFICANT CHANGE UP (ref 12–78)
ANION GAP SERPL CALC-SCNC: 1 MMOL/L — LOW (ref 5–17)
ANION GAP SERPL CALC-SCNC: 1 MMOL/L — LOW (ref 5–17)
APPEARANCE UR: CLEAR — SIGNIFICANT CHANGE UP
APPEARANCE UR: CLEAR — SIGNIFICANT CHANGE UP
AST SERPL-CCNC: 28 U/L — SIGNIFICANT CHANGE UP (ref 15–37)
AST SERPL-CCNC: 28 U/L — SIGNIFICANT CHANGE UP (ref 15–37)
BASE EXCESS BLDV CALC-SCNC: 3.7 MMOL/L — HIGH (ref -2–3)
BASE EXCESS BLDV CALC-SCNC: 3.7 MMOL/L — HIGH (ref -2–3)
BASOPHILS # BLD AUTO: 0.02 K/UL — SIGNIFICANT CHANGE UP (ref 0–0.2)
BASOPHILS # BLD AUTO: 0.02 K/UL — SIGNIFICANT CHANGE UP (ref 0–0.2)
BASOPHILS NFR BLD AUTO: 0.4 % — SIGNIFICANT CHANGE UP (ref 0–2)
BASOPHILS NFR BLD AUTO: 0.4 % — SIGNIFICANT CHANGE UP (ref 0–2)
BILIRUB SERPL-MCNC: 0.3 MG/DL — SIGNIFICANT CHANGE UP (ref 0.2–1.2)
BILIRUB SERPL-MCNC: 0.3 MG/DL — SIGNIFICANT CHANGE UP (ref 0.2–1.2)
BILIRUB UR-MCNC: NEGATIVE — SIGNIFICANT CHANGE UP
BILIRUB UR-MCNC: NEGATIVE — SIGNIFICANT CHANGE UP
BUN SERPL-MCNC: 18 MG/DL — SIGNIFICANT CHANGE UP (ref 7–23)
BUN SERPL-MCNC: 18 MG/DL — SIGNIFICANT CHANGE UP (ref 7–23)
CALCIUM SERPL-MCNC: 8.8 MG/DL — SIGNIFICANT CHANGE UP (ref 8.5–10.1)
CALCIUM SERPL-MCNC: 8.8 MG/DL — SIGNIFICANT CHANGE UP (ref 8.5–10.1)
CHLORIDE SERPL-SCNC: 102 MMOL/L — SIGNIFICANT CHANGE UP (ref 96–108)
CHLORIDE SERPL-SCNC: 102 MMOL/L — SIGNIFICANT CHANGE UP (ref 96–108)
CO2 SERPL-SCNC: 30 MMOL/L — SIGNIFICANT CHANGE UP (ref 22–31)
CO2 SERPL-SCNC: 30 MMOL/L — SIGNIFICANT CHANGE UP (ref 22–31)
COLOR SPEC: YELLOW — SIGNIFICANT CHANGE UP
COLOR SPEC: YELLOW — SIGNIFICANT CHANGE UP
CREAT SERPL-MCNC: 0.78 MG/DL — SIGNIFICANT CHANGE UP (ref 0.5–1.3)
CREAT SERPL-MCNC: 0.78 MG/DL — SIGNIFICANT CHANGE UP (ref 0.5–1.3)
DIFF PNL FLD: NEGATIVE — SIGNIFICANT CHANGE UP
DIFF PNL FLD: NEGATIVE — SIGNIFICANT CHANGE UP
EGFR: 103 ML/MIN/1.73M2 — SIGNIFICANT CHANGE UP
EGFR: 103 ML/MIN/1.73M2 — SIGNIFICANT CHANGE UP
EOSINOPHIL # BLD AUTO: 0.03 K/UL — SIGNIFICANT CHANGE UP (ref 0–0.5)
EOSINOPHIL # BLD AUTO: 0.03 K/UL — SIGNIFICANT CHANGE UP (ref 0–0.5)
EOSINOPHIL NFR BLD AUTO: 0.6 % — SIGNIFICANT CHANGE UP (ref 0–6)
EOSINOPHIL NFR BLD AUTO: 0.6 % — SIGNIFICANT CHANGE UP (ref 0–6)
GLUCOSE SERPL-MCNC: 242 MG/DL — HIGH (ref 70–99)
GLUCOSE SERPL-MCNC: 242 MG/DL — HIGH (ref 70–99)
GLUCOSE UR QL: 500 MG/DL
GLUCOSE UR QL: 500 MG/DL
HCO3 BLDV-SCNC: 30 MMOL/L — HIGH (ref 22–29)
HCO3 BLDV-SCNC: 30 MMOL/L — HIGH (ref 22–29)
HCT VFR BLD CALC: 43.7 % — SIGNIFICANT CHANGE UP (ref 39–50)
HCT VFR BLD CALC: 43.7 % — SIGNIFICANT CHANGE UP (ref 39–50)
HGB BLD-MCNC: 15.2 G/DL — SIGNIFICANT CHANGE UP (ref 13–17)
HGB BLD-MCNC: 15.2 G/DL — SIGNIFICANT CHANGE UP (ref 13–17)
IMM GRANULOCYTES NFR BLD AUTO: 0.6 % — SIGNIFICANT CHANGE UP (ref 0–0.9)
IMM GRANULOCYTES NFR BLD AUTO: 0.6 % — SIGNIFICANT CHANGE UP (ref 0–0.9)
KETONES UR-MCNC: 15 MG/DL
KETONES UR-MCNC: 15 MG/DL
LEUKOCYTE ESTERASE UR-ACNC: NEGATIVE — SIGNIFICANT CHANGE UP
LEUKOCYTE ESTERASE UR-ACNC: NEGATIVE — SIGNIFICANT CHANGE UP
LYMPHOCYTES # BLD AUTO: 1.47 K/UL — SIGNIFICANT CHANGE UP (ref 1–3.3)
LYMPHOCYTES # BLD AUTO: 1.47 K/UL — SIGNIFICANT CHANGE UP (ref 1–3.3)
LYMPHOCYTES # BLD AUTO: 29.6 % — SIGNIFICANT CHANGE UP (ref 13–44)
LYMPHOCYTES # BLD AUTO: 29.6 % — SIGNIFICANT CHANGE UP (ref 13–44)
MAGNESIUM SERPL-MCNC: 2.1 MG/DL — SIGNIFICANT CHANGE UP (ref 1.6–2.6)
MAGNESIUM SERPL-MCNC: 2.1 MG/DL — SIGNIFICANT CHANGE UP (ref 1.6–2.6)
MCHC RBC-ENTMCNC: 31 PG — SIGNIFICANT CHANGE UP (ref 27–34)
MCHC RBC-ENTMCNC: 31 PG — SIGNIFICANT CHANGE UP (ref 27–34)
MCHC RBC-ENTMCNC: 34.8 GM/DL — SIGNIFICANT CHANGE UP (ref 32–36)
MCHC RBC-ENTMCNC: 34.8 GM/DL — SIGNIFICANT CHANGE UP (ref 32–36)
MCV RBC AUTO: 89.2 FL — SIGNIFICANT CHANGE UP (ref 80–100)
MCV RBC AUTO: 89.2 FL — SIGNIFICANT CHANGE UP (ref 80–100)
MONOCYTES # BLD AUTO: 0.44 K/UL — SIGNIFICANT CHANGE UP (ref 0–0.9)
MONOCYTES # BLD AUTO: 0.44 K/UL — SIGNIFICANT CHANGE UP (ref 0–0.9)
MONOCYTES NFR BLD AUTO: 8.9 % — SIGNIFICANT CHANGE UP (ref 2–14)
MONOCYTES NFR BLD AUTO: 8.9 % — SIGNIFICANT CHANGE UP (ref 2–14)
NEUTROPHILS # BLD AUTO: 2.98 K/UL — SIGNIFICANT CHANGE UP (ref 1.8–7.4)
NEUTROPHILS # BLD AUTO: 2.98 K/UL — SIGNIFICANT CHANGE UP (ref 1.8–7.4)
NEUTROPHILS NFR BLD AUTO: 59.9 % — SIGNIFICANT CHANGE UP (ref 43–77)
NEUTROPHILS NFR BLD AUTO: 59.9 % — SIGNIFICANT CHANGE UP (ref 43–77)
NITRITE UR-MCNC: NEGATIVE — SIGNIFICANT CHANGE UP
NITRITE UR-MCNC: NEGATIVE — SIGNIFICANT CHANGE UP
PCO2 BLDV: 54 MMHG — SIGNIFICANT CHANGE UP (ref 42–55)
PCO2 BLDV: 54 MMHG — SIGNIFICANT CHANGE UP (ref 42–55)
PH BLDV: 7.36 — SIGNIFICANT CHANGE UP (ref 7.32–7.43)
PH BLDV: 7.36 — SIGNIFICANT CHANGE UP (ref 7.32–7.43)
PH UR: 7 — SIGNIFICANT CHANGE UP (ref 5–8)
PH UR: 7 — SIGNIFICANT CHANGE UP (ref 5–8)
PHOSPHATE SERPL-MCNC: 3.2 MG/DL — SIGNIFICANT CHANGE UP (ref 2.5–4.5)
PHOSPHATE SERPL-MCNC: 3.2 MG/DL — SIGNIFICANT CHANGE UP (ref 2.5–4.5)
PLATELET # BLD AUTO: 280 K/UL — SIGNIFICANT CHANGE UP (ref 150–400)
PLATELET # BLD AUTO: 280 K/UL — SIGNIFICANT CHANGE UP (ref 150–400)
PO2 BLDV: 48 MMHG — HIGH (ref 25–45)
PO2 BLDV: 48 MMHG — HIGH (ref 25–45)
POTASSIUM SERPL-MCNC: 4.3 MMOL/L — SIGNIFICANT CHANGE UP (ref 3.5–5.3)
POTASSIUM SERPL-MCNC: 4.3 MMOL/L — SIGNIFICANT CHANGE UP (ref 3.5–5.3)
POTASSIUM SERPL-SCNC: 4.3 MMOL/L — SIGNIFICANT CHANGE UP (ref 3.5–5.3)
POTASSIUM SERPL-SCNC: 4.3 MMOL/L — SIGNIFICANT CHANGE UP (ref 3.5–5.3)
PROT SERPL-MCNC: 7.8 GM/DL — SIGNIFICANT CHANGE UP (ref 6–8.3)
PROT SERPL-MCNC: 7.8 GM/DL — SIGNIFICANT CHANGE UP (ref 6–8.3)
PROT UR-MCNC: NEGATIVE MG/DL — SIGNIFICANT CHANGE UP
PROT UR-MCNC: NEGATIVE MG/DL — SIGNIFICANT CHANGE UP
RBC # BLD: 4.9 M/UL — SIGNIFICANT CHANGE UP (ref 4.2–5.8)
RBC # BLD: 4.9 M/UL — SIGNIFICANT CHANGE UP (ref 4.2–5.8)
RBC # FLD: 13.2 % — SIGNIFICANT CHANGE UP (ref 10.3–14.5)
RBC # FLD: 13.2 % — SIGNIFICANT CHANGE UP (ref 10.3–14.5)
SAO2 % BLDV: 78 % — SIGNIFICANT CHANGE UP (ref 67–88)
SAO2 % BLDV: 78 % — SIGNIFICANT CHANGE UP (ref 67–88)
SODIUM SERPL-SCNC: 133 MMOL/L — LOW (ref 135–145)
SODIUM SERPL-SCNC: 133 MMOL/L — LOW (ref 135–145)
SP GR SPEC: >1.03 — HIGH (ref 1–1.03)
SP GR SPEC: >1.03 — HIGH (ref 1–1.03)
TROPONIN I, HIGH SENSITIVITY RESULT: 6.89 NG/L — SIGNIFICANT CHANGE UP
TROPONIN I, HIGH SENSITIVITY RESULT: 6.89 NG/L — SIGNIFICANT CHANGE UP
UROBILINOGEN FLD QL: 0.2 MG/DL — SIGNIFICANT CHANGE UP (ref 0.2–1)
UROBILINOGEN FLD QL: 0.2 MG/DL — SIGNIFICANT CHANGE UP (ref 0.2–1)
WBC # BLD: 4.97 K/UL — SIGNIFICANT CHANGE UP (ref 3.8–10.5)
WBC # BLD: 4.97 K/UL — SIGNIFICANT CHANGE UP (ref 3.8–10.5)
WBC # FLD AUTO: 4.97 K/UL — SIGNIFICANT CHANGE UP (ref 3.8–10.5)
WBC # FLD AUTO: 4.97 K/UL — SIGNIFICANT CHANGE UP (ref 3.8–10.5)

## 2024-01-02 PROCEDURE — 93306 TTE W/DOPPLER COMPLETE: CPT

## 2024-01-02 PROCEDURE — 85652 RBC SED RATE AUTOMATED: CPT

## 2024-01-02 PROCEDURE — 97161 PT EVAL LOW COMPLEX 20 MIN: CPT | Mod: GP

## 2024-01-02 PROCEDURE — 92507 TX SP LANG VOICE COMM INDIV: CPT | Mod: GN

## 2024-01-02 PROCEDURE — 97116 GAIT TRAINING THERAPY: CPT | Mod: GP

## 2024-01-02 PROCEDURE — 83916 OLIGOCLONAL BANDS: CPT

## 2024-01-02 PROCEDURE — 72141 MRI NECK SPINE W/O DYE: CPT

## 2024-01-02 PROCEDURE — 82042 OTHER SOURCE ALBUMIN QUAN EA: CPT

## 2024-01-02 PROCEDURE — 85300 ANTITHROMBIN III ACTIVITY: CPT

## 2024-01-02 PROCEDURE — 70498 CT ANGIOGRAPHY NECK: CPT | Mod: 26,MA

## 2024-01-02 PROCEDURE — 83090 ASSAY OF HOMOCYSTEINE: CPT

## 2024-01-02 PROCEDURE — 87070 CULTURE OTHR SPECIMN AEROBIC: CPT

## 2024-01-02 PROCEDURE — 80048 BASIC METABOLIC PNL TOTAL CA: CPT

## 2024-01-02 PROCEDURE — 84443 ASSAY THYROID STIM HORMONE: CPT

## 2024-01-02 PROCEDURE — 86618 LYME DISEASE ANTIBODY: CPT

## 2024-01-02 PROCEDURE — 0241U: CPT

## 2024-01-02 PROCEDURE — 97165 OT EVAL LOW COMPLEX 30 MIN: CPT | Mod: GO

## 2024-01-02 PROCEDURE — 82945 GLUCOSE OTHER FLUID: CPT

## 2024-01-02 PROCEDURE — 84157 ASSAY OF PROTEIN OTHER: CPT

## 2024-01-02 PROCEDURE — 82164 ANGIOTENSIN I ENZYME TEST: CPT

## 2024-01-02 PROCEDURE — 87483 CNS DNA AMP PROBE TYPE 12-25: CPT

## 2024-01-02 PROCEDURE — 92610 EVALUATE SWALLOWING FUNCTION: CPT | Mod: GN

## 2024-01-02 PROCEDURE — 71275 CT ANGIOGRAPHY CHEST: CPT | Mod: 26,MA

## 2024-01-02 PROCEDURE — 86038 ANTINUCLEAR ANTIBODIES: CPT

## 2024-01-02 PROCEDURE — 97530 THERAPEUTIC ACTIVITIES: CPT | Mod: GO

## 2024-01-02 PROCEDURE — 86235 NUCLEAR ANTIGEN ANTIBODY: CPT

## 2024-01-02 PROCEDURE — 80061 LIPID PANEL: CPT

## 2024-01-02 PROCEDURE — 82784 ASSAY IGA/IGD/IGG/IGM EACH: CPT

## 2024-01-02 PROCEDURE — 85306 CLOT INHIBIT PROT S FREE: CPT

## 2024-01-02 PROCEDURE — 88108 CYTOPATH CONCENTRATE TECH: CPT

## 2024-01-02 PROCEDURE — 89051 BODY FLUID CELL COUNT: CPT

## 2024-01-02 PROCEDURE — 70496 CT ANGIOGRAPHY HEAD: CPT | Mod: 26,MA

## 2024-01-02 PROCEDURE — 83036 HEMOGLOBIN GLYCOSYLATED A1C: CPT

## 2024-01-02 PROCEDURE — 86592 SYPHILIS TEST NON-TREP QUAL: CPT

## 2024-01-02 PROCEDURE — 70552 MRI BRAIN STEM W/DYE: CPT

## 2024-01-02 PROCEDURE — 99222 1ST HOSP IP/OBS MODERATE 55: CPT

## 2024-01-02 PROCEDURE — 92526 ORAL FUNCTION THERAPY: CPT | Mod: GN

## 2024-01-02 PROCEDURE — 74174 CTA ABD&PLVS W/CONTRAST: CPT | Mod: 26,MA

## 2024-01-02 PROCEDURE — 86140 C-REACTIVE PROTEIN: CPT

## 2024-01-02 PROCEDURE — 86147 CARDIOLIPIN ANTIBODY EA IG: CPT

## 2024-01-02 PROCEDURE — 86788 WEST NILE VIRUS AB IGM: CPT

## 2024-01-02 PROCEDURE — 87798 DETECT AGENT NOS DNA AMP: CPT

## 2024-01-02 PROCEDURE — 82962 GLUCOSE BLOOD TEST: CPT

## 2024-01-02 PROCEDURE — 92523 SPEECH SOUND LANG COMPREHEN: CPT | Mod: GN

## 2024-01-02 PROCEDURE — A9579: CPT

## 2024-01-02 PROCEDURE — 93010 ELECTROCARDIOGRAM REPORT: CPT

## 2024-01-02 PROCEDURE — C1751: CPT

## 2024-01-02 PROCEDURE — 86617 LYME DISEASE ANTIBODY: CPT

## 2024-01-02 PROCEDURE — 70551 MRI BRAIN STEM W/O DYE: CPT

## 2024-01-02 PROCEDURE — 85610 PROTHROMBIN TIME: CPT

## 2024-01-02 PROCEDURE — 82040 ASSAY OF SERUM ALBUMIN: CPT

## 2024-01-02 PROCEDURE — 70551 MRI BRAIN STEM W/O DYE: CPT | Mod: 26

## 2024-01-02 PROCEDURE — 85027 COMPLETE CBC AUTOMATED: CPT

## 2024-01-02 PROCEDURE — 86803 HEPATITIS C AB TEST: CPT

## 2024-01-02 PROCEDURE — 36415 COLL VENOUS BLD VENIPUNCTURE: CPT

## 2024-01-02 PROCEDURE — 99285 EMERGENCY DEPT VISIT HI MDM: CPT

## 2024-01-02 PROCEDURE — 86789 WEST NILE VIRUS ANTIBODY: CPT

## 2024-01-02 PROCEDURE — 85303 CLOT INHIBIT PROT C ACTIVITY: CPT

## 2024-01-02 RX ORDER — VALACYCLOVIR 500 MG/1
1 TABLET, FILM COATED ORAL
Qty: 21 | Refills: 0
Start: 2024-01-02 | End: 2024-01-08

## 2024-01-02 RX ORDER — SODIUM CHLORIDE 9 MG/ML
1000 INJECTION INTRAMUSCULAR; INTRAVENOUS; SUBCUTANEOUS
Refills: 0 | Status: DISCONTINUED | OUTPATIENT
Start: 2024-01-02 | End: 2024-01-06

## 2024-01-02 RX ORDER — LEVOTHYROXINE SODIUM 125 MCG
50 TABLET ORAL DAILY
Refills: 0 | Status: DISCONTINUED | OUTPATIENT
Start: 2024-01-02 | End: 2024-01-08

## 2024-01-02 RX ORDER — ACETAMINOPHEN 500 MG
650 TABLET ORAL EVERY 6 HOURS
Refills: 0 | Status: DISCONTINUED | OUTPATIENT
Start: 2024-01-02 | End: 2024-01-08

## 2024-01-02 RX ORDER — ENOXAPARIN SODIUM 100 MG/ML
40 INJECTION SUBCUTANEOUS EVERY 24 HOURS
Refills: 0 | Status: DISCONTINUED | OUTPATIENT
Start: 2024-01-02 | End: 2024-01-08

## 2024-01-02 RX ORDER — ATORVASTATIN CALCIUM 80 MG/1
40 TABLET, FILM COATED ORAL AT BEDTIME
Refills: 0 | Status: DISCONTINUED | OUTPATIENT
Start: 2024-01-02 | End: 2024-01-08

## 2024-01-02 RX ORDER — RAMIPRIL 5 MG
1 CAPSULE ORAL
Refills: 0 | DISCHARGE

## 2024-01-02 RX ORDER — ACETAMINOPHEN 500 MG
1000 TABLET ORAL ONCE
Refills: 0 | Status: COMPLETED | OUTPATIENT
Start: 2024-01-02 | End: 2024-01-02

## 2024-01-02 RX ORDER — KETOROLAC TROMETHAMINE 30 MG/ML
30 SYRINGE (ML) INJECTION ONCE
Refills: 0 | Status: DISCONTINUED | OUTPATIENT
Start: 2024-01-02 | End: 2024-01-02

## 2024-01-02 RX ORDER — SODIUM CHLORIDE 9 MG/ML
1000 INJECTION INTRAMUSCULAR; INTRAVENOUS; SUBCUTANEOUS ONCE
Refills: 0 | Status: COMPLETED | OUTPATIENT
Start: 2024-01-02 | End: 2024-01-02

## 2024-01-02 RX ORDER — VALACYCLOVIR 500 MG/1
1 TABLET, FILM COATED ORAL
Qty: 21 | Refills: 0 | DISCHARGE
Start: 2024-01-02 | End: 2024-01-08

## 2024-01-02 RX ORDER — LEVOTHYROXINE SODIUM 125 MCG
1 TABLET ORAL
Refills: 0 | DISCHARGE

## 2024-01-02 RX ORDER — MORPHINE SULFATE 50 MG/1
4 CAPSULE, EXTENDED RELEASE ORAL ONCE
Refills: 0 | Status: DISCONTINUED | OUTPATIENT
Start: 2024-01-02 | End: 2024-01-02

## 2024-01-02 RX ORDER — LISINOPRIL 2.5 MG/1
20 TABLET ORAL DAILY
Refills: 0 | Status: DISCONTINUED | OUTPATIENT
Start: 2024-01-02 | End: 2024-01-08

## 2024-01-02 RX ORDER — ASPIRIN/CALCIUM CARB/MAGNESIUM 324 MG
81 TABLET ORAL DAILY
Refills: 0 | Status: DISCONTINUED | OUTPATIENT
Start: 2024-01-02 | End: 2024-01-08

## 2024-01-02 RX ORDER — ATORVASTATIN CALCIUM 80 MG/1
1 TABLET, FILM COATED ORAL
Refills: 0 | DISCHARGE

## 2024-01-02 RX ADMIN — MORPHINE SULFATE 4 MILLIGRAM(S): 50 CAPSULE, EXTENDED RELEASE ORAL at 18:57

## 2024-01-02 RX ADMIN — Medication 30 MILLIGRAM(S): at 16:26

## 2024-01-02 RX ADMIN — Medication 1000 MILLIGRAM(S): at 19:00

## 2024-01-02 RX ADMIN — ATORVASTATIN CALCIUM 40 MILLIGRAM(S): 80 TABLET, FILM COATED ORAL at 23:43

## 2024-01-02 RX ADMIN — SODIUM CHLORIDE 1000 MILLILITER(S): 9 INJECTION INTRAMUSCULAR; INTRAVENOUS; SUBCUTANEOUS at 16:26

## 2024-01-02 RX ADMIN — SODIUM CHLORIDE 125 MILLILITER(S): 9 INJECTION INTRAMUSCULAR; INTRAVENOUS; SUBCUTANEOUS at 22:14

## 2024-01-02 RX ADMIN — Medication 30 MILLIGRAM(S): at 19:00

## 2024-01-02 RX ADMIN — Medication 400 MILLIGRAM(S): at 16:27

## 2024-01-02 NOTE — H&P ADULT - NSHPLABSRESULTS_GEN_ALL_CORE
LABS:                        15.2   4.97  )-----------( 280      ( 02 Jan 2024 14:38 )             43.7     01-02    133<L>  |  102  |  18  ----------------------------<  242<H>  4.3   |  30  |  0.78    Ca    8.8      02 Jan 2024 14:38  Phos  3.2     01-02  Mg     2.1     01-02    TPro  7.8  /  Alb  3.6  /  TBili  0.3  /  DBili  x   /  AST  28  /  ALT  35  /  Alk Phos  72  01-02        < from: CT Angio Head and Neck w/ IV Cont (01.02.24 @ 15:23) >    IMPRESSION:    CTA BRAIN: Atheromatous irregularity along the cavernous ICAs   bilaterally. Otherwise, no flow-limiting stenosis or occlusion.    CTANECK: Patent cervical vasculature. No flow limiting stenosis or   occlusion.    --- End of Report ---    < end of copied text >        < from: CT Angio Chest, Abdomen and Pelvis w/ IV Cont (01.02.24 @ 15:24) >    IMPRESSION:    No acute aortic syndrome.    --- End of Report ---    < end of copied text >

## 2024-01-02 NOTE — H&P ADULT - ASSESSMENT
The patient is a 58y Male with significant PMH of DM-1 on insulin pump, Hypothyroidism, HTN and HPL presented in ED with c/o weakness to right face, inability to close right eyes, intermittent double and blurry vision of right eye since today morning.  Patient's wife Ms. Burrell present at bedside in ED. He also has some slurred and garbled speech per his wife. He also feels numbness to right side of his tongue. His gait was also off balance. He also has some upper back pain for last 7-10 days, and he was seen here in ED on Dec 26, and he was given Methocarbamol and Diclofenac which he was taking with any relief in his upper back pain. He denies any fall or injury to his spine.  Today he was seen in an office as outpatient by an Orthopedic Spine surgeon Dr. Jorden Blas, and he was referred to ED due to blurry vision and diplopia. Upon arrival in ED, his NIH SS was 2. No code stroke in ED. His CBC and CMP unremarkable except BS of 242. Mg, Troponin and Phosphorus level wnl. UA negative.   negative for any acute process negative aortic syndrome.      # TIA vs CVA.  -Unable to close right eye with blurry and double vision right eye with right sided tongue numbness.  -Mostly resolved,  No focal deficits.  -CT angio Head and Neck, CT angio Chest, abdomen and Pelvis are unremarkable.  -Admit to telemetry.  -Vitals and neuro-checks per protocol.  -Lipid panel and A1c level in am.  Hypercoagulation panel.  -MRI brain in am.  -ASA 81 mg po added.  -Continue his Atorvastatin.  -PT/OT eval.  -Follow neurology consult in am    # Uncontrolled DM-1, on Insulin pump.  -.  -Will get A1c level.  -He says that he will manage his Insulin pump by himself.    -# HTN and Dyslipidemia.  -On Ramipril and Atorvastatin.    # H/o Hypothyroidism.  -On Levothyroxine.    # DVT prophylaxis: Lovenox sq daily.    Plan of cere d/w the patient and his wife at bedside in detail, and they verbalized understanding.

## 2024-01-02 NOTE — ED ADULT NURSE NOTE - NSFALLUNIVINTERV_ED_ALL_ED
Bed/Stretcher in lowest position, wheels locked, appropriate side rails in place/Call bell, personal items and telephone in reach/Instruct patient to call for assistance before getting out of bed/chair/stretcher/Non-slip footwear applied when patient is off stretcher/[place] to call system/Physically safe environment - no spills, clutter or unnecessary equipment/Purposeful proactive rounding/Room/bathroom lighting operational, light cord in reach Bed/Stretcher in lowest position, wheels locked, appropriate side rails in place/Call bell, personal items and telephone in reach/Instruct patient to call for assistance before getting out of bed/chair/stretcher/Non-slip footwear applied when patient is off stretcher/[place] to call system/Physically safe environment - no spills, clutter or unnecessary equipment/Purposeful proactive rounding/Room/bathroom lighting operational, light cord in reach

## 2024-01-02 NOTE — ED PROVIDER NOTE - PHYSICAL EXAMINATION
VA right eye is 20/40  left eye is 20/20  both ey4es is 20/20    inability to fully close right eye.   able to raise both eyebrows and lift both sides of forehead.   very mild lisp new for pt

## 2024-01-02 NOTE — ED ADULT TRIAGE NOTE - CHIEF COMPLAINT QUOTE
pt ambulatory to ED c/o right sided facial numbness, numbness in tongue and "feeling off balance" pt denies weakness in extremities, no slurred speech or facial droop noted. MD Arellano in triage, no code stroke called. pt brought to main ED for eval.

## 2024-01-02 NOTE — ED ADULT TRIAGE NOTE - HEIGHT IN CM
PT WAS ROUND ON 3L NC. ASSESSED PT AND HIS VITALS WERE 85 HR SPO2 98%. EXPLAINED TO PT HE 
DIDNT NEED O2 AND ASKED  IF HE WAS SOB. HE STATED SOMETIMES WHEN HE FEELS ANXIOUS AND WHEN 
HE GETS UP AND MOVES AROUND. LET PT AND FAMILY KNOW I TALKED TO RN AND SHE WOUND ORDER A PRN 
BREATHING TX INCAASE PT WERE TO NEED IT. WILL CONTINUE TO MONITOR. NO DISTRESS NOTED. ROOM 
AIR SATS ARE 95%. 177.8

## 2024-01-02 NOTE — H&P ADULT - HISTORY OF PRESENT ILLNESS
Chief Complaint: numbness.    · Chief Complaint: The patient is a 58y Male complaining of numbness.  · HPI Objective Statement: 58M presents with weakness to right face, inability to close right eyes, intermittent double blurry vision of right eye. Started yesterday morning. Also w/ back pain for over 2 weeks, burning in nature uppe back between shoulder blades, now radiating to neck. No fevers/chills. Taking Diclofenax and Methocarbaom for back pain. no hx stroke. no family hx stroke. no smoking. he is type 1 DM on pump. No tick bites. no revcent URI. no recent travel. intermittent lisp since yesterday morning.   Chief Complaint: numbness.    The patient is a 58y Male with significant PMH of DM-1 on insulin pump, Hypothyroidism, HTN and HPL presented in ED with c/o weakness to right face, inability to close right eyes, intermittent double and blurry vision of right eye since today morning.  Patient's wife Ms. Burrell present at bedside in ED. He also has some slurred and garbled speech per his wife. He also feels numbness to right side of his tongue. His gait was also off balance. He also has some upper back pain for last 7-10 days, and he was seen here in ED on Dec 26, and he was given Methocarbamol and Diclofenac which he was taking with any relief in his upper back pain. He denies any fall or injury to his spine.  Today he was seen in an office as outpatient by an Orthopedic Spine surgeon Dr. Jorden Blas, and he was referred to ED due to blurry vision and diplopia. Upon arrival in ED, his NIH SS was 2. No code stroke in ED. His CBC and CMP unremarkable except BS of 242. Mg, Troponin and Phosphorus level wnl. UA negative.  CT angio Head and Neck unremarkable.  CT angio Chest, abdomen and Pelvis negative for any acute process negative aortic syndrome.    ED Provider Dr. Arellano  spoke to neurology on call, and will see in am.

## 2024-01-02 NOTE — H&P ADULT - NSICDXPASTMEDICALHX_GEN_ALL_CORE_FT
PAST MEDICAL HISTORY:  HLD (hyperlipidemia)     HTN (hypertension)      PAST MEDICAL HISTORY:  HLD (hyperlipidemia)     HTN (hypertension)     Insulin pump status     Type 1 diabetes mellitus

## 2024-01-02 NOTE — ED PROVIDER NOTE - CLINICAL SUMMARY MEDICAL DECISION MAKING FREE TEXT BOX
adult make presents with over 24 hours of neuro deficits. mild and non disabbling. vss. no fever. NIH 2. cta's negative for acute finding. neuro ocnsulted. pt took 81mg asa today. already on a  statin.

## 2024-01-02 NOTE — ED PROVIDER NOTE - OBJECTIVE STATEMENT
58M presents with weakness to right face, inability to close right eyes, intermittent double blurry vision of right eye. Started yesterday morning. Also w/ back pain for over 2 weeks, burning in nature uppe back between shoulder blades, now radiating to neck. No fevers/chills. Taking Diclofenax and Methocarbaom for back pain. no hx stroke. no family hx stroke. no smoking. he is type 1 DM on pump. No tick bites. no revcent URI. no recent travel. intermittent lisp since yesterday morning.

## 2024-01-02 NOTE — ED ADULT NURSE NOTE - OBJECTIVE STATEMENT
58yM presents to the ED with wife for reports of facial numbness. pt reports x1.5weeks ago he developed BL scapular pain (R scapula > L scapula). pt was seen at urgent care, given Diclofenac and Robaxin. states then came to ED x5days ago for pain and was told to follow up with spine specialist who he follow up with today and was told it was not his spine. pt also reports x1.5days ago he developed R sided facial numbness, numbness in his tongue and difficulty closing his eye. also reports generalized "feeling off balance". pt also reports his "speech seems off to them, almost slurred" for the past 1.5day also. Pt AAOx4, GCS=15. PERRL intact, pupils 3mm reactive BL. Full strength in all extremitites. +finger  BL.  No facial droop, dysarthia, or weakness noted at this time. Pt denies headache, vision changes, numbness/tingling, dizziness/lightheadedness. Bed locked in lowest position with appropriate side rails raised. Pt given call bell and explained call bell system. Pt aware of plan to await provider reeval/results. Pt has no other questions or concerns at this time.     pt ambulatory to ED c/o right sided facial numbness, numbness in tongue and "feeling off balance" pt denies weakness in extremities, no slurred speech or facial droop noted. MD Arellano in triage, no code stroke called. pt brought to main ED for eval.

## 2024-01-03 ENCOUNTER — TRANSCRIPTION ENCOUNTER (OUTPATIENT)
Age: 59
End: 2024-01-03

## 2024-01-03 ENCOUNTER — RESULT REVIEW (OUTPATIENT)
Age: 59
End: 2024-01-03

## 2024-01-03 LAB
A1C WITH ESTIMATED AVERAGE GLUCOSE RESULT: 7.7 % — HIGH (ref 4–5.6)
A1C WITH ESTIMATED AVERAGE GLUCOSE RESULT: 7.7 % — HIGH (ref 4–5.6)
A1C WITH ESTIMATED AVERAGE GLUCOSE RESULT: 8 % — HIGH (ref 4–5.6)
A1C WITH ESTIMATED AVERAGE GLUCOSE RESULT: 8 % — HIGH (ref 4–5.6)
ADD ON TEST-SPECIMEN IN LAB: SIGNIFICANT CHANGE UP
ANION GAP SERPL CALC-SCNC: 2 MMOL/L — LOW (ref 5–17)
ANION GAP SERPL CALC-SCNC: 2 MMOL/L — LOW (ref 5–17)
APPEARANCE CSF: CLEAR — SIGNIFICANT CHANGE UP
APPEARANCE CSF: CLEAR — SIGNIFICANT CHANGE UP
APPEARANCE SPUN FLD: ABNORMAL
APPEARANCE SPUN FLD: ABNORMAL
AT III ACT/NOR PPP CHRO: 99 % — SIGNIFICANT CHANGE UP (ref 85–135)
AT III ACT/NOR PPP CHRO: 99 % — SIGNIFICANT CHANGE UP (ref 85–135)
B BURGDOR C6 AB SER-ACNC: POSITIVE
B BURGDOR C6 AB SER-ACNC: POSITIVE
B BURGDOR IGG+IGM SER QL IB: SIGNIFICANT CHANGE UP
B BURGDOR IGG+IGM SER QL IB: SIGNIFICANT CHANGE UP
B BURGDOR IGG+IGM SER-ACNC: 8.93 INDEX — HIGH (ref 0.01–0.89)
B BURGDOR IGG+IGM SER-ACNC: 8.93 INDEX — HIGH (ref 0.01–0.89)
BACTERIAL AG PNL SER: 10 % — SIGNIFICANT CHANGE UP
BACTERIAL AG PNL SER: 10 % — SIGNIFICANT CHANGE UP
BUN SERPL-MCNC: 13 MG/DL — SIGNIFICANT CHANGE UP (ref 7–23)
BUN SERPL-MCNC: 13 MG/DL — SIGNIFICANT CHANGE UP (ref 7–23)
CALCIUM SERPL-MCNC: 8.9 MG/DL — SIGNIFICANT CHANGE UP (ref 8.5–10.1)
CALCIUM SERPL-MCNC: 8.9 MG/DL — SIGNIFICANT CHANGE UP (ref 8.5–10.1)
CARDIOLIPIN AB SER-ACNC: NEGATIVE — SIGNIFICANT CHANGE UP
CARDIOLIPIN AB SER-ACNC: NEGATIVE — SIGNIFICANT CHANGE UP
CHLORIDE SERPL-SCNC: 104 MMOL/L — SIGNIFICANT CHANGE UP (ref 96–108)
CHLORIDE SERPL-SCNC: 104 MMOL/L — SIGNIFICANT CHANGE UP (ref 96–108)
CHOLEST SERPL-MCNC: 121 MG/DL — SIGNIFICANT CHANGE UP
CHOLEST SERPL-MCNC: 121 MG/DL — SIGNIFICANT CHANGE UP
CO2 SERPL-SCNC: 30 MMOL/L — SIGNIFICANT CHANGE UP (ref 22–31)
CO2 SERPL-SCNC: 30 MMOL/L — SIGNIFICANT CHANGE UP (ref 22–31)
COLOR CSF: ABNORMAL
COLOR CSF: ABNORMAL
CREAT SERPL-MCNC: 0.65 MG/DL — SIGNIFICANT CHANGE UP (ref 0.5–1.3)
CREAT SERPL-MCNC: 0.65 MG/DL — SIGNIFICANT CHANGE UP (ref 0.5–1.3)
CRP SERPL-MCNC: <3 MG/L — SIGNIFICANT CHANGE UP
CRP SERPL-MCNC: <3 MG/L — SIGNIFICANT CHANGE UP
CULTURE RESULTS: NO GROWTH — SIGNIFICANT CHANGE UP
CULTURE RESULTS: NO GROWTH — SIGNIFICANT CHANGE UP
DSDNA AB FLD-ACNC: <0.2 AI — SIGNIFICANT CHANGE UP
DSDNA AB FLD-ACNC: <0.2 AI — SIGNIFICANT CHANGE UP
EGFR: 109 ML/MIN/1.73M2 — SIGNIFICANT CHANGE UP
EGFR: 109 ML/MIN/1.73M2 — SIGNIFICANT CHANGE UP
ENA SS-A AB FLD IA-ACNC: <0.2 AI — SIGNIFICANT CHANGE UP
ENA SS-A AB FLD IA-ACNC: <0.2 AI — SIGNIFICANT CHANGE UP
ERYTHROCYTE [SEDIMENTATION RATE] IN BLOOD: 6 MM/HR — SIGNIFICANT CHANGE UP (ref 0–20)
ERYTHROCYTE [SEDIMENTATION RATE] IN BLOOD: 6 MM/HR — SIGNIFICANT CHANGE UP (ref 0–20)
ESTIMATED AVERAGE GLUCOSE: 174 MG/DL — HIGH (ref 68–114)
ESTIMATED AVERAGE GLUCOSE: 174 MG/DL — HIGH (ref 68–114)
ESTIMATED AVERAGE GLUCOSE: 183 MG/DL — HIGH (ref 68–114)
ESTIMATED AVERAGE GLUCOSE: 183 MG/DL — HIGH (ref 68–114)
GLUCOSE BLDC GLUCOMTR-MCNC: 132 MG/DL — HIGH (ref 70–99)
GLUCOSE BLDC GLUCOMTR-MCNC: 161 MG/DL — HIGH (ref 70–99)
GLUCOSE BLDC GLUCOMTR-MCNC: 161 MG/DL — HIGH (ref 70–99)
GLUCOSE BLDC GLUCOMTR-MCNC: 172 MG/DL — HIGH (ref 70–99)
GLUCOSE BLDC GLUCOMTR-MCNC: 172 MG/DL — HIGH (ref 70–99)
GLUCOSE BLDC GLUCOMTR-MCNC: 206 MG/DL — HIGH (ref 70–99)
GLUCOSE BLDC GLUCOMTR-MCNC: 206 MG/DL — HIGH (ref 70–99)
GLUCOSE BLDC GLUCOMTR-MCNC: 209 MG/DL — HIGH (ref 70–99)
GLUCOSE BLDC GLUCOMTR-MCNC: 209 MG/DL — HIGH (ref 70–99)
GLUCOSE CSF-MCNC: 107 MG/DL — HIGH (ref 40–70)
GLUCOSE CSF-MCNC: 107 MG/DL — HIGH (ref 40–70)
GLUCOSE SERPL-MCNC: 156 MG/DL — HIGH (ref 70–99)
GLUCOSE SERPL-MCNC: 156 MG/DL — HIGH (ref 70–99)
GRAM STN FLD: SIGNIFICANT CHANGE UP
GRAM STN FLD: SIGNIFICANT CHANGE UP
HCV AB S/CO SERPL IA: 0.12 S/CO — SIGNIFICANT CHANGE UP (ref 0–0.99)
HCV AB S/CO SERPL IA: 0.12 S/CO — SIGNIFICANT CHANGE UP (ref 0–0.99)
HCV AB SERPL-IMP: SIGNIFICANT CHANGE UP
HCV AB SERPL-IMP: SIGNIFICANT CHANGE UP
HCYS SERPL-MCNC: 8.5 UMOL/L — SIGNIFICANT CHANGE UP
HCYS SERPL-MCNC: 8.5 UMOL/L — SIGNIFICANT CHANGE UP
HDLC SERPL-MCNC: 27 MG/DL — LOW
HDLC SERPL-MCNC: 27 MG/DL — LOW
INR BLD: 1 RATIO — SIGNIFICANT CHANGE UP (ref 0.85–1.18)
INR BLD: 1 RATIO — SIGNIFICANT CHANGE UP (ref 0.85–1.18)
LIPID PNL WITH DIRECT LDL SERPL: 74 MG/DL — SIGNIFICANT CHANGE UP
LIPID PNL WITH DIRECT LDL SERPL: 74 MG/DL — SIGNIFICANT CHANGE UP
LYME IGG AB: >45 INDEX — HIGH (ref 0.01–0.9)
LYME IGG AB: >45 INDEX — HIGH (ref 0.01–0.9)
LYME IGG INTERP: POSITIVE
LYME IGG INTERP: POSITIVE
LYME IGM AB: 0.94 INDEX — HIGH (ref 0.01–0.9)
LYME IGM AB: 0.94 INDEX — HIGH (ref 0.01–0.9)
LYME IGM INTERP: ABNORMAL
LYME IGM INTERP: ABNORMAL
LYMPHOCYTES # CSF: 75 % — SIGNIFICANT CHANGE UP (ref 40–80)
LYMPHOCYTES # CSF: 75 % — SIGNIFICANT CHANGE UP (ref 40–80)
MONOS+MACROS NFR CSF: 3 % — LOW (ref 15–45)
MONOS+MACROS NFR CSF: 3 % — LOW (ref 15–45)
NEUTROPHILS # CSF: 0 % — SIGNIFICANT CHANGE UP (ref 0–6)
NEUTROPHILS # CSF: 0 % — SIGNIFICANT CHANGE UP (ref 0–6)
NON HDL CHOLESTEROL: 93 MG/DL — SIGNIFICANT CHANGE UP
NON HDL CHOLESTEROL: 93 MG/DL — SIGNIFICANT CHANGE UP
NRBC NFR CSF: 255 /UL — HIGH (ref 0–5)
NRBC NFR CSF: 255 /UL — HIGH (ref 0–5)
OTHER CELLS CSF MANUAL: 12 % — SIGNIFICANT CHANGE UP
OTHER CELLS CSF MANUAL: 12 % — SIGNIFICANT CHANGE UP
POTASSIUM SERPL-MCNC: 4.3 MMOL/L — SIGNIFICANT CHANGE UP (ref 3.5–5.3)
POTASSIUM SERPL-MCNC: 4.3 MMOL/L — SIGNIFICANT CHANGE UP (ref 3.5–5.3)
POTASSIUM SERPL-SCNC: 4.3 MMOL/L — SIGNIFICANT CHANGE UP (ref 3.5–5.3)
POTASSIUM SERPL-SCNC: 4.3 MMOL/L — SIGNIFICANT CHANGE UP (ref 3.5–5.3)
PROT C ACT/NOR PPP: 107 % — SIGNIFICANT CHANGE UP (ref 74–150)
PROT C ACT/NOR PPP: 107 % — SIGNIFICANT CHANGE UP (ref 74–150)
PROT CSF-MCNC: 119 MG/DL — HIGH (ref 15–45)
PROT CSF-MCNC: 119 MG/DL — HIGH (ref 15–45)
PROTHROM AB SERPL-ACNC: 11.3 SEC — SIGNIFICANT CHANGE UP (ref 9.5–13)
PROTHROM AB SERPL-ACNC: 11.3 SEC — SIGNIFICANT CHANGE UP (ref 9.5–13)
RBC # CSF: 4 /UL — HIGH (ref 0–0)
RBC # CSF: 4 /UL — HIGH (ref 0–0)
SODIUM SERPL-SCNC: 136 MMOL/L — SIGNIFICANT CHANGE UP (ref 135–145)
SODIUM SERPL-SCNC: 136 MMOL/L — SIGNIFICANT CHANGE UP (ref 135–145)
SPECIMEN SOURCE: SIGNIFICANT CHANGE UP
TRIGL SERPL-MCNC: 99 MG/DL — SIGNIFICANT CHANGE UP
TRIGL SERPL-MCNC: 99 MG/DL — SIGNIFICANT CHANGE UP
TSH SERPL-MCNC: 2.4 UU/ML — SIGNIFICANT CHANGE UP (ref 0.34–4.82)
TSH SERPL-MCNC: 2.4 UU/ML — SIGNIFICANT CHANGE UP (ref 0.34–4.82)
TUBE TYPE: SIGNIFICANT CHANGE UP
TUBE TYPE: SIGNIFICANT CHANGE UP

## 2024-01-03 PROCEDURE — 88108 CYTOPATH CONCENTRATE TECH: CPT | Mod: 26

## 2024-01-03 PROCEDURE — 93306 TTE W/DOPPLER COMPLETE: CPT | Mod: 26

## 2024-01-03 PROCEDURE — 72141 MRI NECK SPINE W/O DYE: CPT | Mod: 26

## 2024-01-03 PROCEDURE — 99223 1ST HOSP IP/OBS HIGH 75: CPT

## 2024-01-03 PROCEDURE — 99232 SBSQ HOSP IP/OBS MODERATE 35: CPT

## 2024-01-03 PROCEDURE — 70552 MRI BRAIN STEM W/DYE: CPT | Mod: 26

## 2024-01-03 RX ORDER — ONDANSETRON 8 MG/1
4 TABLET, FILM COATED ORAL EVERY 8 HOURS
Refills: 0 | Status: DISCONTINUED | OUTPATIENT
Start: 2024-01-03 | End: 2024-01-08

## 2024-01-03 RX ORDER — SODIUM CHLORIDE 9 MG/ML
1000 INJECTION, SOLUTION INTRAVENOUS
Refills: 0 | Status: DISCONTINUED | OUTPATIENT
Start: 2024-01-03 | End: 2024-01-08

## 2024-01-03 RX ORDER — DEXTROSE 50 % IN WATER 50 %
25 SYRINGE (ML) INTRAVENOUS ONCE
Refills: 0 | Status: DISCONTINUED | OUTPATIENT
Start: 2024-01-03 | End: 2024-01-08

## 2024-01-03 RX ORDER — INSULIN LISPRO 100/ML
1 VIAL (ML) SUBCUTANEOUS
Refills: 0 | Status: DISCONTINUED | OUTPATIENT
Start: 2024-01-03 | End: 2024-01-08

## 2024-01-03 RX ORDER — DEXTROSE 50 % IN WATER 50 %
12.5 SYRINGE (ML) INTRAVENOUS ONCE
Refills: 0 | Status: DISCONTINUED | OUTPATIENT
Start: 2024-01-03 | End: 2024-01-08

## 2024-01-03 RX ORDER — DEXTROSE 50 % IN WATER 50 %
15 SYRINGE (ML) INTRAVENOUS ONCE
Refills: 0 | Status: DISCONTINUED | OUTPATIENT
Start: 2024-01-03 | End: 2024-01-08

## 2024-01-03 RX ORDER — GLUCAGON INJECTION, SOLUTION 0.5 MG/.1ML
1 INJECTION, SOLUTION SUBCUTANEOUS ONCE
Refills: 0 | Status: DISCONTINUED | OUTPATIENT
Start: 2024-01-03 | End: 2024-01-08

## 2024-01-03 RX ADMIN — Medication 50 MICROGRAM(S): at 05:34

## 2024-01-03 RX ADMIN — LISINOPRIL 20 MILLIGRAM(S): 2.5 TABLET ORAL at 09:00

## 2024-01-03 RX ADMIN — Medication 110 MILLIGRAM(S): at 17:17

## 2024-01-03 RX ADMIN — ENOXAPARIN SODIUM 40 MILLIGRAM(S): 100 INJECTION SUBCUTANEOUS at 02:28

## 2024-01-03 RX ADMIN — Medication 81 MILLIGRAM(S): at 09:00

## 2024-01-03 RX ADMIN — SODIUM CHLORIDE 75 MILLILITER(S): 9 INJECTION INTRAMUSCULAR; INTRAVENOUS; SUBCUTANEOUS at 02:28

## 2024-01-03 RX ADMIN — Medication 1 MILLIGRAM(S): at 17:56

## 2024-01-03 RX ADMIN — ATORVASTATIN CALCIUM 40 MILLIGRAM(S): 80 TABLET, FILM COATED ORAL at 22:05

## 2024-01-03 RX ADMIN — SODIUM CHLORIDE 75 MILLILITER(S): 9 INJECTION INTRAMUSCULAR; INTRAVENOUS; SUBCUTANEOUS at 17:16

## 2024-01-03 NOTE — DISCHARGE NOTE PROVIDER - NPI NUMBER (FOR SYSADMIN USE ONLY) :
[[id number] [[id number]] [[id number]],[[id number]] [[id number]],[[id number]]

## 2024-01-03 NOTE — OCCUPATIONAL THERAPY INITIAL EVALUATION ADULT - NSACTIVITYREC_GEN_A_OT
Pt with no acute OT needs, pt (I) with ADL and fxl mobility tasks. Would recommend assistance with IADL tasks as easing back into routine, and getting cleared with MD prior to resuming driving and work related activities.

## 2024-01-03 NOTE — OCCUPATIONAL THERAPY INITIAL EVALUATION ADULT - RANGE OF MOTION EXAMINATION, UPPER EXTREMITY
pt reports h/o R labrum tear and b/l hands with carpel tunnel so hands have some stiffness/rigidity/bilateral UE Active ROM was WFL  (within functional limits)

## 2024-01-03 NOTE — PROCEDURE NOTE - ADDITIONAL PROCEDURE DETAILS
Patient was given 1 mg lorazepam prior to the procedure.  CSF sent for cell count, glucose, protein, lyme, vdrl, ACE, PCR, cytology.

## 2024-01-03 NOTE — PHYSICAL THERAPY INITIAL EVALUATION ADULT - GENERAL OBSERVATIONS, REHAB EVAL
Pt was found standing in the room holding onto IV pole, tele on, family at bedside, spouse and dtr who is a RN, Pt is c/o facial weakness and numbness, slurred speech, no extremity weakness, willing to participate in PT, eating his breakfast in between.

## 2024-01-03 NOTE — CONSULT NOTE ADULT - SUBJECTIVE AND OBJECTIVE BOX
CC;  blurry vision and diplopia with numbness on R side of face.       HPI:  The patient is a 58y Male with significant PMH of DM-1 on insulin pump, Hypothyroidism, HTN and HPL presented in ED with c/o weakness to right face, inability to close right eyes, intermittent double and blurry vision of right eye, numbness perioral and R side of tongue since today 1/1.  As per ER documentation Patient's wife Ms. Burrell was present at bedside in ED and stated he also has some slurred and garbled speech, but patient states this is due to the numbness around his mouth and R side of tongue.  He also has some upper back pain for last 7-10 days, and he was seen here in ED on Dec 26, and he was given Methocarbamol and Diclofenac which he was taking with any relief in his upper back pain. He denies any fall or injury to his spine.  Today he was seen in an office as outpatient by an Orthopedic Spine surgeon Dr. Jorden Blas, and he was referred to ED due to blurry vision and diplopia. Upon arrival in ED, his NIH SS was 2. No code stroke in ED.  CTA H&N and MRI was neg for any acute findings.  Currently his diplopia/blurry vision is resolved, he does have neck melisa, R>L sided HA, perioral numbness and R sided tongue numbness, now cannot close both eyes tightly, cannot smile fully.  He does have a h/o dry eyes and corneal abrasions as a result of chronic dry eyes, does have mouth dryness and does c/o stiffening of fingers.  He was exposed to raking a lot of leaves recently and unsure of tick exposure.  He did have a cold which resolved 2 weeks ago.  No recent travel.    Of note: He was seen here in ED on Dec 20 2020 for a mechanical fall and sustained transverse precess fractures of the lumbar spine.  He was cleared by Neurosurgery and was to follow up with Dr. Denton outpatient            PAST MEDICAL & SURGICAL HISTORY:  HTN (hypertension)      HLD (hyperlipidemia)      Type 1 diabetes mellitus      Insulin pump status      Injury of right middle finger          FAMILY HISTORY:  FH: breast cancer (Mother)        Social Hx:  Nonsmoker, no drug or alcohol use    MEDICATIONS  (STANDING):  aspirin enteric coated 81 milliGRAM(s) Oral daily  atorvastatin 40 milliGRAM(s) Oral at bedtime  dextrose 5%. 1000 milliLiter(s) (50 mL/Hr) IV Continuous <Continuous>  dextrose 5%. 1000 milliLiter(s) (100 mL/Hr) IV Continuous <Continuous>  dextrose 50% Injectable 25 Gram(s) IV Push once  dextrose 50% Injectable 25 Gram(s) IV Push once  dextrose 50% Injectable 12.5 Gram(s) IV Push once  enoxaparin Injectable 40 milliGRAM(s) SubCutaneous every 24 hours  glucagon  Injectable 1 milliGRAM(s) IntraMuscular once  insulin lispro (HumaLOG) Pump 1 Each SubCutaneous Continuous Pump  levothyroxine 50 MICROGram(s) Oral daily  lisinopril 20 milliGRAM(s) Oral daily  sodium chloride 0.9%. 1000 milliLiter(s) (75 mL/Hr) IV Continuous <Continuous>  sodium chloride 0.9%. 1000 milliLiter(s) (125 mL/Hr) IV Continuous <Continuous>       Allergies  No Known Allergies      ROS: Pertinent positives in HPI, all other ROS were reviewed and are negative.      Vital Signs Last 24 Hrs  T(C): 36.4 (03 Jan 2024 09:13), Max: 36.8 (02 Jan 2024 14:27)  T(F): 97.5 (03 Jan 2024 09:13), Max: 98.3 (02 Jan 2024 19:00)  HR: 58 (03 Jan 2024 09:13) (58 - 93)  BP: 138/60 (03 Jan 2024 09:13) (138/60 - 167/81)  BP(mean): 96 (02 Jan 2024 14:27) (96 - 96)  RR: 18 (03 Jan 2024 09:13) (18 - 18)  SpO2: 98% (03 Jan 2024 09:13) (97% - 100%)        Constitutional: awake, NAD, well-groomed, well-developed  HEAD: NC/AT, no temporal tenderness  Neck: Supple.  Extremities:  no edema  Musculoskeletal:  no abnormal movements  Skin: No rashes    Neurological exam:  HF: A x O x 3. Appropriately interactive, normal affect. Speech slightly dysarthric, No Aphasia or paraphasic errors. Naming /repetition intact   CN: AB, EOMI, VFF, unable to close both eyes tightly, unable to smile fully, facial sensation normal, mild L NLFDand loss of crease in L side of forhead, tongue midline, Palate moves equally, SCM equal bilaterally  Motor: No pronator drift, Strength 5/5 in all 4 ext, normal bulk and tone, no tremor, + rigidity in fingers   Sens: Intact to light touch   Reflexes: Symmetric and normal, downgoing toes b/l  Coord:  No FNFA, dysmetria  Gait/Balance: NML    NIHSS: 2 for mild dysarthria, mild L facial asymmetry          Labs:   01-03    136  |  104  |  13  ----------------------------<  156<H>  4.3   |  30  |  0.65    Ca    8.9      03 Jan 2024 07:50  Phos  3.2     01-02  Mg     2.1     01-02    TPro  7.8  /  Alb  3.6  /  TBili  0.3  /  DBili  x   /  AST  28  /  ALT  35  /  AlkPhos  72  01-02                              15.2   4.97  )-----------( 280      ( 02 Jan 2024 14:38 )             43.7       Radiology:  < from: MR Head No Cont (01.02.24 @ 22:32) >    IMPRESSION: No acute intracranial hemorrhage or evidence of acute   ischemia.    < from: CT Angio Neck w/ IV Cont (01.02.24 @ 15:23) >  IMPRESSION:    CTA BRAIN: Atheromatous irregularity along the cavernous ICAs   bilaterally. Otherwise, no flow-limiting stenosis or occlusion.    CTANECK: Patent cervical vasculature. No flow limiting stenosis or   occlusion.    < from: MR Cervical Spine No Cont (01.03.24 @ 10:39) >  IMPRESSION:  1. Straightening of lordosis can be seen in the setting of muscle spasm.  2. C3-C4 and C4-C5 disc bulges, resulting in mild left neural foramen   stenosis with uncovertebral spurring.  3. C5-C6 right paracentral disc protrusion superimposed upon a disc   bulge, impinging upon the thecal sac, resulting in mild central canal and   mild left neural foramen stenosis with uncovertebral spurring.  4. No cord compression or gross altered cord signal.  5. Additional findings described in detail above.                     CC;  blurry vision and diplopia with numbness on R side of face.       HPI:  The patient is a 58y Male with significant PMH of DM-1 on insulin pump, Hypothyroidism, HTN and HPL presented in ED with c/o weakness to right face, inability to close right eyes, intermittent double and blurry vision of right eye, numbness perioral and R side of tongue since today 1/1.  As per ER documentation Patient's wife Ms. uBrrell was present at bedside in ED and stated he also has some slurred and garbled speech, but patient states this is due to the numbness around his mouth and R side of tongue.  He also has some upper back pain for last 7-10 days, and he was seen here in ED on Dec 26, and he was given Methocarbamol and Diclofenac which he was taking with any relief in his upper back pain. He denies any fall or injury to his spine.  Today he was seen in an office as outpatient by an Orthopedic Spine surgeon Dr. Jorden Blas, and he was referred to ED due to blurry vision and diplopia. Upon arrival in ED, his NIH SS was 2. No code stroke in ED.  CTA H&N and MRI was neg for any acute findings.  Currently his diplopia/blurry vision is resolved, he does have neck melisa, R>L sided HA, perioral numbness and R sided tongue numbness, now cannot close both eyes tightly, cannot smile fully.  He does have a h/o dry eyes and corneal abrasions as a result of chronic dry eyes, does have mouth dryness and does c/o stiffening of fingers.  He was exposed to raking a lot of leaves recently and unsure of tick exposure.  He did have a cold which resolved 2 weeks ago.  No recent travel.    Of note: He was seen here in ED on Dec 20 2020 for a mechanical fall and sustained transverse precess fractures of the lumbar spine.  He was cleared by Neurosurgery and was to follow up with Dr. Denton outpatient            PAST MEDICAL & SURGICAL HISTORY:  HTN (hypertension)      HLD (hyperlipidemia)      Type 1 diabetes mellitus      Insulin pump status      Injury of right middle finger          FAMILY HISTORY:  FH: breast cancer (Mother)        Social Hx:  Nonsmoker, no drug or alcohol use    MEDICATIONS  (STANDING):  aspirin enteric coated 81 milliGRAM(s) Oral daily  atorvastatin 40 milliGRAM(s) Oral at bedtime  dextrose 5%. 1000 milliLiter(s) (50 mL/Hr) IV Continuous <Continuous>  dextrose 5%. 1000 milliLiter(s) (100 mL/Hr) IV Continuous <Continuous>  dextrose 50% Injectable 25 Gram(s) IV Push once  dextrose 50% Injectable 25 Gram(s) IV Push once  dextrose 50% Injectable 12.5 Gram(s) IV Push once  enoxaparin Injectable 40 milliGRAM(s) SubCutaneous every 24 hours  glucagon  Injectable 1 milliGRAM(s) IntraMuscular once  insulin lispro (HumaLOG) Pump 1 Each SubCutaneous Continuous Pump  levothyroxine 50 MICROGram(s) Oral daily  lisinopril 20 milliGRAM(s) Oral daily  sodium chloride 0.9%. 1000 milliLiter(s) (75 mL/Hr) IV Continuous <Continuous>  sodium chloride 0.9%. 1000 milliLiter(s) (125 mL/Hr) IV Continuous <Continuous>       Allergies  No Known Allergies      ROS: Pertinent positives in HPI, all other ROS were reviewed and are negative.      Vital Signs Last 24 Hrs  T(C): 36.4 (03 Jan 2024 09:13), Max: 36.8 (02 Jan 2024 14:27)  T(F): 97.5 (03 Jan 2024 09:13), Max: 98.3 (02 Jan 2024 19:00)  HR: 58 (03 Jan 2024 09:13) (58 - 93)  BP: 138/60 (03 Jan 2024 09:13) (138/60 - 167/81)  BP(mean): 96 (02 Jan 2024 14:27) (96 - 96)  RR: 18 (03 Jan 2024 09:13) (18 - 18)  SpO2: 98% (03 Jan 2024 09:13) (97% - 100%)        Constitutional: awake, NAD, well-groomed, well-developed  HEAD: NC/AT, no temporal tenderness  Neck: Supple.  Extremities:  no edema  Musculoskeletal:  no abnormal movements  Skin: No rashes    Neurological exam:  HF: A x O x 3. Appropriately interactive, normal affect. Speech slightly dysarthric, No Aphasia or paraphasic errors. Naming /repetition intact   CN: AB, EOMI, VFF, unable to close both eyes tightly, unable to smile fully, facial sensation normal, mild L NLFDand loss of crease in L side of forhead, tongue midline, Palate moves equally, SCM equal bilaterally  Motor: No pronator drift, Strength 5/5 in all 4 ext, normal bulk and tone, no tremor, + rigidity in fingers   Sens: Intact to light touch   Reflexes: Symmetric and normal, downgoing toes b/l  Coord:  No FNFA, dysmetria  Gait/Balance: NML    NIHSS: 2 for mild dysarthria, mild L facial asymmetry          Labs:   01-03    136  |  104  |  13  ----------------------------<  156<H>  4.3   |  30  |  0.65    Ca    8.9      03 Jan 2024 07:50  Phos  3.2     01-02  Mg     2.1     01-02    TPro  7.8  /  Alb  3.6  /  TBili  0.3  /  DBili  x   /  AST  28  /  ALT  35  /  AlkPhos  72  01-02                              15.2   4.97  )-----------( 280      ( 02 Jan 2024 14:38 )             43.7       Radiology:  < from: MR Head No Cont (01.02.24 @ 22:32) >    IMPRESSION: No acute intracranial hemorrhage or evidence of acute   ischemia.    < from: CT Angio Neck w/ IV Cont (01.02.24 @ 15:23) >  IMPRESSION:    CTA BRAIN: Atheromatous irregularity along the cavernous ICAs   bilaterally. Otherwise, no flow-limiting stenosis or occlusion.    CTANECK: Patent cervical vasculature. No flow limiting stenosis or   occlusion.    < from: MR Cervical Spine No Cont (01.03.24 @ 10:39) >  IMPRESSION:  1. Straightening of lordosis can be seen in the setting of muscle spasm.  2. C3-C4 and C4-C5 disc bulges, resulting in mild left neural foramen   stenosis with uncovertebral spurring.  3. C5-C6 right paracentral disc protrusion superimposed upon a disc   bulge, impinging upon the thecal sac, resulting in mild central canal and   mild left neural foramen stenosis with uncovertebral spurring.  4. No cord compression or gross altered cord signal.  5. Additional findings described in detail above.

## 2024-01-03 NOTE — CONSULT NOTE ADULT - ASSESSMENT
The patient is a 58y Male with significant PMH of DM-1 on insulin pump, Hypothyroidism, HTN and HPL presented in ED with c/o weakness to right face, inability to close right eyes, intermittent double and blurry vision of right eye, numbness perioral and R side of tongue since today 1/1.  As per ER documentation Patient's wife Ms. Burrell was present at bedside in ED and stated he also has some slurred and garbled speech, but patient states this is due to the numbness around his mouth and R side of tongue.  He also has some upper back pain for last 7-10 days, and he was seen here in ED on Dec 26, and he was given Methocarbamol and Diclofenac which he was taking with any relief in his upper back pain. He denies any fall or injury to his spine.  Today he was seen in an office as outpatient by an Orthopedic Spine surgeon Dr. Jorden Blas, and he was referred to ED due to blurry vision and diplopia. Upon arrival in ED, his NIH SS was 2. No code stroke in ED.  CTA H&N and MRI was neg for any acute findings.  Currently his diplopia/blurry vision is resolved, he does have neck pain, R>L sided HA, perioral numbness and R sided tongue numbness, now cannot close both eyes tightly, cannot smile fully.  He does have a h/o dry eyes and corneal abrasions as a result of chronic dry eyes, does have mouth dryness and does c/o stiffening of fingers.  He was exposed to raking a lot of leaves recently and unsure of tick exposure.  He did have a cold which resolved 2 weeks ago.  No recent travel.      #Pt. has multiple complaints: HA, diplopia/blurry vision resolved, unable to close R eye initially tightly which is now b/l, perioral and R tongue numbness, dryness of eyes(chronic) and mouth(acute), loss of forhead crease L side , with mild NLFD on L.  On PE he has some features of Bell's Palsy on the L.  Unsure if complex migraine could be contributing and/or autoimmune d/o such as Sjogren's syndrome.  Also will need to r/o Lyme disease.  MRI c-spine c/w muscle spasm, no cord compression.  MRI brain no stroke.      Recommendations:  -Check Lyme Ab, ESR, CRP, MARIO, Sjogren's AB's  -will follow           The patient is a 58y Male with significant PMH of DM-1 on insulin pump, Hypothyroidism, HTN and HPL presented in ED with c/o weakness to right face, inability to close right eyes, intermittent double and blurry vision of right eye, numbness perioral and R side of tongue since today 1/1.  As per ER documentation Patient's wife Ms. Burrell was present at bedside in ED and stated he also has some slurred and garbled speech, but patient states this is due to the numbness around his mouth and R side of tongue.  He also has some upper back pain for last 7-10 days, and he was seen here in ED on Dec 26, and he was given Methocarbamol and Diclofenac which he was taking with any relief in his upper back pain. He denies any fall or injury to his spine.  Today he was seen in an office as outpatient by an Orthopedic Spine surgeon Dr. Jorden Blas, and he was referred to ED due to blurry vision and diplopia. Upon arrival in ED, his NIH SS was 2. No code stroke in ED.  CTA H&N and MRI was neg for any acute findings.  Currently his diplopia/blurry vision is resolved, he does have neck pain, R>L sided HA, perioral numbness and R sided tongue numbness, now cannot close both eyes tightly, cannot smile fully.  He does have a h/o dry eyes and corneal abrasions as a result of chronic dry eyes, does have mouth dryness and does c/o stiffening of fingers.  He was exposed to raking a lot of leaves recently and unsure of tick exposure.  He did have a cold which resolved 2 weeks ago.  No recent travel.      #Pt. has multiple complaints/multiple CN palsies: HA, diplopia/blurry vision resolved, unable to close R eye initially tightly which is now b/l, perioral and R tongue numbness, dryness of eyes(chronic) and mouth(acute), loss of forhead crease L side , with mild NLFD on L.  On PE he has some features of Bell's Palsy on the L.  Unsure if complex migraine could be contributing and/or autoimmune d/o such as Sjogren's syndrome.  Also will need to r/o Lyme disease.  MRI c-spine c/w muscle spasm, no cord compression.  MRI brain no stroke.      Recommendations:  -Check Lyme Ab, ESR, CRP, MARIO, Sjogren's AB's  -MRI brain with IV contrast to look for brain lesions  -will follow    D/W Dr.. Ferraro

## 2024-01-03 NOTE — PHYSICAL THERAPY INITIAL EVALUATION ADULT - NSPTDISCHREC_GEN_A_CORE
Pt was able to amb 200' independently holding onto IV pole, steady on feet, no dizziness, Pt was left sitting in chair at end of PT rx, family in room, Pt does not require acute care PT, DC from PT, pt can amb with floor staff/No skilled PT needs

## 2024-01-03 NOTE — PROVIDER CONTACT NOTE (OTHER) - BACKGROUND
Pt admitted for R sided facial weakness and numbness along with R eye vision changes
Pt admitted to 3N for numbness, R eye vision changes

## 2024-01-03 NOTE — OCCUPATIONAL THERAPY INITIAL EVALUATION ADULT - GENERAL OBSERVATIONS, REHAB EVAL
Pt rec'd/left semi-supine in bed, daughter at bedside, bed alarmed, +IV infusing, +HM, lines intact, pt agreeable to OT IE.

## 2024-01-03 NOTE — OCCUPATIONAL THERAPY INITIAL EVALUATION ADULT - PRECAUTIONS/LIMITATIONS, REHAB EVAL
diet: CCD, elevate HOB 30 degrees, supervise meals/aspiration precautions/cardiac precautions/fall precautions

## 2024-01-03 NOTE — DISCHARGE NOTE PROVIDER - HOSPITAL COURSE
The patient is a 58y Male with significant PMH of DM-1 on insulin pump, Hypothyroidism, HTN and HPL presented in ED with c/o weakness to right face, inability to close right eyes, intermittent double and blurry vision of right eye since today morning.  Patient's wife Ms. Burrell present at bedside in ED. He also has some slurred and garbled speech per his wife. He also feels numbness to right side of his tongue. His gait was also off balance. He also has some upper back pain for last 7-10 days, and he was seen here in ED on Dec 26, and he was given Methocarbamol and Diclofenac which he was taking with any relief in his upper back pain. He denies any fall or injury to his spine.  Today he was seen in an office as outpatient by an Orthopedic Spine surgeon Dr. Jorden Blas, and he was referred to ED due to blurry vision and diplopia. Upon arrival in ED, his NIH SS was 2. No code stroke in ED. His CBC and CMP unremarkable except BS of 242. Mg, Troponin and Phosphorus level wnl. UA negative.    Medical progress: Denies any HA, CP, SOB. + Neck pain  Complaints: no new complaints  State of mind:     Physical Exam:   GENERAL APPEARANCE:  NAD, hemodynamically stable  T(C): 36.4 (01-03-24 @ 09:13), Max: 36.8 (01-02-24 @ 14:27)  HR: 58 (01-03-24 @ 09:13) (58 - 93)  BP: 138/60 (01-03-24 @ 09:13) (138/60 - 167/81)  RR: 18 (01-03-24 @ 09:13) (18 - 18)  SpO2: 98% (01-03-24 @ 09:13) (97% - 100%)  Wt(kg): --  HEENT:  Head is normocephalic    Skin:  Warm and dry without any rash   NECK:  Supple without lymphadenopathy.   HEART:  Regular rate and rhythm. normal S1 and S2, No M/R/G  LUNGS:  Good ins/exp effort, no W/R/R/C  ABDOMEN:  Soft, nontender, nondistended with good bowel sounds heard  EXTREMITIES:  Without cyanosis, clubbing or edema.   NEUROLOGICAL:  Gross nonfocal    incomplete note  incomplete note   incomplete note    HPI: The patient is a 58y Male with significant PMH of DM-1 on insulin pump, Hypothyroidism, HTN and HPL presented in ED with c/o weakness to right face, inability to close right eyes, intermittent double and blurry vision of right eye since today morning.  Patient's wife Ms. Burrell present at bedside in ED. He also has some slurred and garbled speech per his wife. He also feels numbness to right side of his tongue. His gait was also off balance. He also has some upper back pain for last 7-10 days, and he was seen here in ED on Dec 26, and he was given Methocarbamol and Diclofenac which he was taking with any relief in his upper back pain. He denies any fall or injury to his spine.  Today he was seen in an office as outpatient by an Orthopedic Spine surgeon Dr. Jorden Blas, and he was referred to ED due to blurry vision and diplopia. Upon arrival in ED, his NIH SS was 2. No code stroke in ED. His CBC and CMP unremarkable except BS of 242. Mg, Troponin and Phosphorus level wnl. UA negative.    Hospital course (by problem):   # Lyme meningitis  # Lyme disease  # B/l facial palsy  # Acute meningitis   - CVA has been ruled out  - Abnormal LP with high WBC  - 1/3 he ws noted with Lyme AB detected and given doxycycline, then ceftriaxone  - Probable tick exposure several weeks/ months ago  - On ceftriaxone 2 gm IV qd; will be discontinued with MIDLINE to continue Ceftriaxone for 3 additional weeks. Orders placed by Dr. Clint GLASGOW  - Neurology following. Treated with IV solumedrol inpatient.   - Daily speech therapy inpatient  - Overall improvement in dysarthria, facial muscle weakness, chewing    #Abnormal MARIO   +MARIO 1:320, speckled  - Nonspecific, follow up outpatient    # DMI  - continue with DM pump    # DVT proph: Lovenox  Lab holiday tomorrow     Patient was discharged to: Home with long term antibiotics     New medications: Ceftriaxone 1 g IV daily for 3 additional weeks      Physical exam at the time of discharge:  VITALS:   T(C): 36.7 (01-08-24 @ 10:11), Max: 37.1 (01-07-24 @ 21:43)  HR: 78 (01-08-24 @ 10:11) (65 - 83)  BP: 140/60 (01-08-24 @ 10:11) (124/52 - 144/61)  RR: 20 (01-08-24 @ 10:11) (18 - 20)  SpO2: 99% (01-08-24 @ 10:11) (87% - 99%)    PHYSICAL EXAM:  General: No acute distress  HEENT: NC/AT; PERRL, anicteric sclera; MMM  Neck: Supple  Cardiovascular: +S1/S2, RRR, no murmurs, rubs, gallops  Respiratory: CTA B/L; no W/R/R  Gastrointestinal: soft, NT/ND; +BSx4  Extremities: WWP; no edema, clubbing or cyanosis  Vascular: 2+ radial, DP/PT pulses B/L  Neurological: AOx3.+bilateral facial muscle weakness L weaker than R. Extremities with 5/5 strength x4. Dysarthria improving HPI: The patient is a 58y Male with significant PMH of DM-1 on insulin pump, Hypothyroidism, HTN and HPL presented in ED with c/o weakness to right face, inability to close right eyes, intermittent double and blurry vision of right eye since today morning.  Patient's wife Ms. Burrell present at bedside in ED. He also has some slurred and garbled speech per his wife. He also feels numbness to right side of his tongue. His gait was also off balance. He also has some upper back pain for last 7-10 days, and he was seen here in ED on Dec 26, and he was given Methocarbamol and Diclofenac which he was taking with any relief in his upper back pain. He denies any fall or injury to his spine.  Today he was seen in an office as outpatient by an Orthopedic Spine surgeon Dr. Jorden Blas, and he was referred to ED due to blurry vision and diplopia. Upon arrival in ED, his NIH SS was 2. No code stroke in ED. His CBC and CMP unremarkable except BS of 242. Mg, Troponin and Phosphorus level wnl. UA negative.    Hospital course (by problem):   # Lyme meningitis  # Lyme disease  # B/l facial palsy  # Acute meningitis   - CVA has been ruled out  - Abnormal LP with high WBC  - 1/3 he ws noted with Lyme AB detected and given doxycycline, then ceftriaxone  - Probable tick exposure several weeks/ months ago  - On ceftriaxone 2 gm IV qd; will be discontinued with MIDLINE to continue Ceftriaxone 2 g daily for 3 additional weeks. Orders placed by Dr. Clint GLASGOW  - Neurology following. Treated with IV solumedrol inpatient. Will start PO taper tomorrow for 6 additional days. Discussed taper with Neuro   - Daily speech therapy inpatient  - Overall improvement in dysarthria, facial muscle weakness, chewing  - Follow up with LP studies outpatient, still pending     #Abnormal MARIO   +MARIO 1:320, speckled  - Nonspecific, follow up outpatient    # DMI  - continue with DM pump    Patient was discharged to: Home with long term antibiotics     New medications: Ceftriaxone 2 g IV daily for 3 additional weeks. 6 day Prednisone taper      Physical exam at the time of discharge:  VITALS:   T(C): 36.7 (01-08-24 @ 10:11), Max: 37.1 (01-07-24 @ 21:43)  HR: 78 (01-08-24 @ 10:11) (65 - 83)  BP: 140/60 (01-08-24 @ 10:11) (124/52 - 144/61)  RR: 20 (01-08-24 @ 10:11) (18 - 20)  SpO2: 99% (01-08-24 @ 10:11) (87% - 99%)    PHYSICAL EXAM:  General: No acute distress  HEENT: NC/AT; PERRL, anicteric sclera; MMM  Neck: Supple  Cardiovascular: +S1/S2, RRR, no murmurs, rubs, gallops  Respiratory: CTA B/L; no W/R/R  Gastrointestinal: soft, NT/ND; +BSx4  Extremities: WWP; no edema, clubbing or cyanosis  Vascular: 2+ radial, DP/PT pulses B/L  Neurological: AOx3.+bilateral facial muscle weakness L weaker than R. Extremities with 5/5 strength x4. Dysarthria improving

## 2024-01-03 NOTE — PATIENT PROFILE ADULT - FALL HARM RISK - HARM RISK INTERVENTIONS
Assistance with ambulation/Assistance OOB with selected safe patient handling equipment/Communicate Risk of Fall with Harm to all staff/Discuss with provider need for PT consult/Monitor for mental status changes/Monitor gait and stability/Provide patient with walking aids - walker, cane, crutches/Reinforce activity limits and safety measures with patient and family/Reorient to person, place and time as needed/Sit up slowly, dangle for a short time, stand at bedside before walking/Tailored Fall Risk Interventions/Use of alarms - bed, chair and/or voice tab/Visual Cue: Yellow wristband and red socks/Bed in lowest position, wheels locked, appropriate side rails in place/Call bell, personal items and telephone in reach/Instruct patient to call for assistance before getting out of bed or chair/Non-slip footwear when patient is out of bed/[place] to call system/Physically safe environment - no spills, clutter or unnecessary equipment/Purposeful Proactive Rounding/Room/bathroom lighting operational, light cord in reach Assistance with ambulation/Assistance OOB with selected safe patient handling equipment/Communicate Risk of Fall with Harm to all staff/Discuss with provider need for PT consult/Monitor for mental status changes/Monitor gait and stability/Provide patient with walking aids - walker, cane, crutches/Reinforce activity limits and safety measures with patient and family/Reorient to person, place and time as needed/Sit up slowly, dangle for a short time, stand at bedside before walking/Tailored Fall Risk Interventions/Use of alarms - bed, chair and/or voice tab/Visual Cue: Yellow wristband and red socks/Bed in lowest position, wheels locked, appropriate side rails in place/Call bell, personal items and telephone in reach/Instruct patient to call for assistance before getting out of bed or chair/Non-slip footwear when patient is out of bed/[place] to call system/Physically safe environment - no spills, clutter or unnecessary equipment/Purposeful Proactive Rounding/Room/bathroom lighting operational, light cord in reach

## 2024-01-03 NOTE — OCCUPATIONAL THERAPY INITIAL EVALUATION ADULT - VISUAL ASSESSMENT: TRACKING
smooth pursuits intact, saccades intact, visual field assessment- normal, pt reports all visual symptoms (diplopia or blurry vision) have resolved today- reports h/o cataracts and retinopathy

## 2024-01-03 NOTE — DISCHARGE NOTE PROVIDER - CARE PROVIDERS DIRECT ADDRESSES
,[email or web address] [email or web address] ,[email or web address] ,[email or web address] ,[email or web address]

## 2024-01-03 NOTE — PHYSICAL THERAPY INITIAL EVALUATION ADULT - DIAGNOSIS, PT EVAL
TIA vs CVA. Unable to close right eye with blurry and double vision right eye with right sided tongue numbness. TIA vs CVA. Unable to close right eye with blurry and double vision right eye with right sided tongue numbness., No Extremity Weakness

## 2024-01-03 NOTE — DISCHARGE NOTE PROVIDER - NSDCMRMEDTOKEN_GEN_ALL_CORE_FT
atorvastatin 40 mg oral tablet: 1 tab(s) orally once a day  diclofenac sodium 50 mg oral delayed release tablet: 1 tab(s) orally every 12 hours  insulin lispro 100 units/mL injectable solution: pt uses [employer] 770g Insulin Pump:    Basal Rate in u/hr:  12am - 2am : 1.0  2am - 5am : 2.3  5am - 8am : 2.5  8am - 5pm : 1.7  5pm - 10pm : 1.4  10pm - 12am : 1.0    Carb Ratio in g/u:  12am - 12pm : 4.0  12pm - 4pm : 5.0  4pm - 12am : 6.0    Sensitivity Factor in mg/dL:  12am - 7am : 30  7am - 12pm : 25    Target levels: 90 - 130  levothyroxine 50 mcg (0.05 mg) oral tablet: 1 tab(s) orally once a day before breakfast  methocarbamol 500 mg oral tablet: 2 tab(s) orally every 8 hours  ramipril 5 mg oral capsule: 1 cap(s) orally once a day  Valtrex 1 g oral tablet: 1 tab(s) orally 3 times a day ***new med prescribed today by Dr. Arellano*** MDD: 3   atorvastatin 40 mg oral tablet: 1 tab(s) orally once a day  diclofenac sodium 50 mg oral delayed release tablet: 1 tab(s) orally every 12 hours  insulin lispro 100 units/mL injectable solution: pt uses [employer] 770g Insulin Pump:    Basal Rate in u/hr:  12am - 2am : 1.0  2am - 5am : 2.3  5am - 8am : 2.5  8am - 5pm : 1.7  5pm - 10pm : 1.4  10pm - 12am : 1.0    Carb Ratio in g/u:  12am - 12pm : 4.0  12pm - 4pm : 5.0  4pm - 12am : 6.0    Sensitivity Factor in mg/dL:  12am - 7am : 30  7am - 12pm : 25    Target levels: 90 - 130  levothyroxine 50 mcg (0.05 mg) oral tablet: 1 tab(s) orally once a day before breakfast  methocarbamol 500 mg oral tablet: 2 tab(s) orally every 8 hours  ramipril 5 mg oral capsule: 1 cap(s) orally once a day  Valtrex 1 g oral tablet: 1 tab(s) orally 3 times a day ***new med prescribed today by Dr. Arellano*** MDD: 3   aspirin 81 mg oral delayed release tablet: 1 tab(s) orally once a day  atorvastatin 40 mg oral tablet: 1 tab(s) orally once a day  cefTRIAXone: 2 intravenous once a day 2 g IV once per day every 24 hours x3 additional weeks  diclofenac sodium 50 mg oral delayed release tablet: 1 tab(s) orally every 12 hours  insulin lispro 100 units/mL injectable solution: injectable every 3 hours pt uses [employer] 770g Insulin Pump:    Basal Rate in u/hr:  12am - 2am : 1.0  2am - 5am : 2.3  5am - 8am : 2.5  8am - 5pm : 1.7  5pm - 10pm : 1.4  10pm - 12am : 1.0    Carb Ratio in g/u:  12am - 12pm : 4.0  12pm - 4pm : 5.0  4pm - 12am : 6.0    Sensitivity Factor in mg/dL:  12am - 7am : 30  7am - 12pm : 25    Target levels: 90 - 130  levothyroxine 50 mcg (0.05 mg) oral tablet: 1 tab(s) orally once a day before breakfast  methocarbamol 500 mg oral tablet: 2 tab(s) orally every 8 hours  pantoprazole 40 mg oral delayed release tablet: 1 tab(s) orally once a day (before a meal)  predniSONE 10 mg oral tablet: 1 tab(s) orally 1/09/24: Please take Prednisone 50 mg once   1/10/24: Please take Prednisone 50 mg once   1/11/24: Please take Prednisone 40 mg once   1/12/24: Please take Prednisone 30 mg once   1/13/24: Please take Prednisone 20 mg once   1/14/24: Please take Prednisone 10 mg once  ramipril 5 mg oral capsule: 1 cap(s) orally once a day

## 2024-01-03 NOTE — PROVIDER CONTACT NOTE (OTHER) - SITUATION
Pt is OK with us checking his sugar but has his own insulin pump that he wants to use. As per pt not to use own insulin pump until needed

## 2024-01-03 NOTE — OCCUPATIONAL THERAPY INITIAL EVALUATION ADULT - LEVEL OF INDEPENDENCE: DRESS LOWER BODY, OT EVAL
educated pt on safety and compensatory considerations- to perform task in sitting, hip hike technique to help with back pain/independent

## 2024-01-03 NOTE — PROVIDER CONTACT NOTE (OTHER) - SITUATION
Pt blood sugar was 209 - pt saying his insulin monitoring is saying he needs 2.3 units and pt wants to use his own insulin pump. Education was provided to patient to wait until doctors orders were in

## 2024-01-03 NOTE — OCCUPATIONAL THERAPY INITIAL EVALUATION ADULT - PERTINENT HX OF CURRENT PROBLEM, REHAB EVAL
Per EMR pt is a 57 y/o male with significant PMHx of DM-1 on insulin pump, Hypothyroidism, HTN and HPL presented in ED with c/o weakness to right face, inability to close right eyes, intermittent double and blurry vision of right eye since yesterday morning. He also has some slurred and garbled speech per his wife. He also feels numbness to right side of his tongue. His gait was also off balance. He also has some upper back pain for last 7-10 days, and he was seen here in ED on Dec 26. He was seen in an office as outpatient by an Orthopedic Spine surgeon Dr. Jorden Blas, and he was referred to ED due to blurry vision and diplopia. Upon arrival in ED, his NIHSS was 2. No code stroke in ED. His CBC and CMP unremarkable except BS of 242. Mg, Troponin and Phosphorus level wnl. UA negative.    -CT angio Head and Neck unremarkable.  -CT angio Chest, abdomen and Pelvis negative for any acute process negative aortic syndrome.  -MR head: No acute intracranial hemorrhage or evidence of acute ischemia. Per EMR pt is a 59 y/o male with significant PMHx of DM-1 on insulin pump, Hypothyroidism, HTN and HPL presented in ED with c/o weakness to right face, inability to close right eyes, intermittent double and blurry vision of right eye since yesterday morning. He also has some slurred and garbled speech per his wife. He also feels numbness to right side of his tongue. His gait was also off balance. He also has some upper back pain for last 7-10 days, and he was seen here in ED on Dec 26. He was seen in an office as outpatient by an Orthopedic Spine surgeon Dr. Jorden Blas, and he was referred to ED due to blurry vision and diplopia. Upon arrival in ED, his NIHSS was 2. No code stroke in ED. His CBC and CMP unremarkable except BS of 242. Mg, Troponin and Phosphorus level wnl. UA negative.    -CT angio Head and Neck unremarkable.  -CT angio Chest, abdomen and Pelvis negative for any acute process negative aortic syndrome.  -MR head: No acute intracranial hemorrhage or evidence of acute ischemia. Per EMR pt is a 59 y/o male with significant PMHx of DM-1 on insulin pump, Hypothyroidism, HTN and HPL presented in ED with c/o weakness to right face, inability to close right eyes, intermittent double and blurry vision of right eye since yesterday morning. He also has some slurred and garbled speech per his wife. He also feels numbness to right side of his tongue. His gait was also off balance. He also has some upper back pain for last 7-10 days, and he was seen here in ED on Dec 26. He was seen in an office as outpatient by an Orthopedic Spine surgeon Dr. Jorden Blas, and he was referred to ED due to blurry vision and diplopia. Upon arrival in ED, his NIHSS was 2. No code stroke in ED. His CBC and CMP unremarkable except BS of 242. Mg, Troponin and Phosphorus level wnl. UA negative. Per neuro consult note they are checking Lyme Ab, ESR, CRP, MARIO, Sjogren's AB's, and MRI brain with IV contrast to look for brain lesions.    -CT angio Head and Neck unremarkable.  -CT angio Chest, abdomen and Pelvis negative for any acute process negative aortic syndrome.  -MR head: No acute intracranial hemorrhage or evidence of acute ischemia.

## 2024-01-03 NOTE — OCCUPATIONAL THERAPY INITIAL EVALUATION ADULT - ADDITIONAL COMMENTS
Pt resides in a   with his wife with 3 SERGIO and FOS to bedroom. Pt reports being (I) with all ADL/IADL tasks PTA, walked without AD, RHD, +working, +, very active.

## 2024-01-03 NOTE — CONSULT NOTE ADULT - NS ATTEND AMEND GEN_ALL_CORE FT
59 yo man with poorly controlled diabetes (HbA1C 8) presenting with right facial weakness. Now with evidence of b/l facial weakness. Also reports perioral/tongue numbness.    No infarct seen on MRI brain.  MRI brain with contrast ordered (r/o malignancy)  R/O infectious cause - check lyme  Check ACE level.  If imaging is negative will consider LP. 57 yo man with poorly controlled diabetes (HbA1C 8) presenting with right facial weakness. Now with evidence of b/l facial weakness. Also reports perioral/tongue numbness.    No infarct seen on MRI brain.  MRI brain with contrast ordered (r/o malignancy)  R/O infectious cause - check lyme  Check ACE level.  If imaging is negative will consider LP.

## 2024-01-03 NOTE — DISCHARGE NOTE PROVIDER - CARE PROVIDER_API CALL
Zoë Ferraro  Neurology  [address], [address]  [place] [address]  Phone: [phone]  Fax: [phone]  Follow Up Time:     Eliel Denton  Neurosurgery  [address] [place] [address]  Phone: [phone]  Fax: [phone]  Follow Up Time:    Zoë Ferraro  Neurology  [address] [facility], [address]  [place] [address]  Phone: [phone]  Fax: [phone]  Follow Up Time:     Eliel Denton  Neurosurgery  [address] [place] [address]  Phone: [phone]  Fax: [phone]  Follow Up Time:    Zoë Ferraro.  Neurology  [address], [address]  [place] [address]  Phone: [phone]  Fax: [phone]  Follow Up Time: 2 weeks    Eliel Denton  Neurosurgery  [address] [place] [address]  Phone: [phone]  Fax: [phone]  Follow Up Time: 1 month    Khris Yeboah  [facility]  [address], [address] [address]  Phone: [phone]  Fax: [phone]  Follow Up Time: 2 weeks   Zoë Ferraro.  Neurology  [address], [address]  [place] [address]  Phone: [phone]  Fax: [phone]  Follow Up Time: 2 weeks    Eliel Denton  Neurosurgery  [address] [place] [address]  Phone: [phone]  Fax: [phone]  Follow Up Time: 1 month    Khrsi Yeboah  [facility]  [address], [address] [address]  Phone: [phone]  Fax: [phone]  Follow Up Time: 2 weeks

## 2024-01-03 NOTE — PROGRESS NOTE ADULT - SUBJECTIVE AND OBJECTIVE BOX
The patient is a 58y Male with significant PMH of DM-1 on insulin pump, Hypothyroidism, HTN and HPL presented in ED with c/o weakness to right face, inability to close right eyes, intermittent double and blurry vision of right eye since today morning.  Patient's wife Ms. Burrell present at bedside in ED. He also has some slurred and garbled speech per his wife. He also feels numbness to right side of his tongue. His gait was also off balance. He also has some upper back pain for last 7-10 days, and he was seen here in ED on Dec 26, and he was given Methocarbamol and Diclofenac which he was taking with any relief in his upper back pain. He denies any fall or injury to his spine.  Today he was seen in an office as outpatient by an Orthopedic Spine surgeon Dr. Jorden Blas, and he was referred to ED due to blurry vision and diplopia. Upon arrival in ED, his NIH SS was 2. No code stroke in ED. His CBC and CMP unremarkable except BS of 242. Mg, Troponin and Phosphorus level wnl. UA negative.  CT angio Head and Neck unremarkable.  CT angio Chest, abdomen and Pelvis negative for any acute process negative aortic syndrome.    - Feeling of pain getting backpain (2/10).   - "Facial numbness - or maybe a paralysis" -  less sensory > motor " centered around lips eyelid and eyebrows. unable to close eyes.     REVIEW OF SYSTEMS:  General: NAD, hemodynamically stable   HEENT:  Eyes:  No visual loss, blurred vision, double vision or yellow sclerae. Ears, Nose, Throat:  No hearing loss, sneezing, congestion, runny nose or sore throat.  SKIN:  No rash or itching.  CARDIOVASCULAR:  No chest pain, chest pressure or chest discomfort. No palpitations or edema.  RESPIRATORY:  No shortness of breath, cough or sputum.  GASTROINTESTINAL:  No anorexia, nausea, vomiting or diarrhea. No abdominal pain or blood.  NEUROLOGICAL:  No headache, dizziness, syncope, paralysis, ataxia, numbness or tingling in the extremities. No change in bowel or bladder control.  MUSCULOSKELETAL:  No muscle, back pain, joint pain or stiffness.  HEMATOLOGIC:  No anemia, bleeding or bruising.  LYMPHATICS:  No enlarged nodes. No history of splenectomy.  ENDOCRINOLOGIC:  No reports of sweating, cold or heat intolerance. No polyuria or polydipsia.  ALLERGIES:  No history of asthma, hives, eczema or rhinitis.    Physical Exam:   GENERAL APPEARANCE:  NAD, hemodynamically stable  T(C): 36.4 (01-03-24 @ 09:13), Max: 36.8 (01-02-24 @ 19:00)  HR: 58 (01-03-24 @ 09:13) (58 - 93)  BP: 138/60 (01-03-24 @ 09:13) (138/60 - 167/81)  RR: 18 (01-03-24 @ 09:13) (18 - 18)  SpO2: 98% (01-03-24 @ 09:13) (97% - 100%)  HEENT:  Head is normocephalic    Skin:  Warm and dry without any rash   NECK:  Supple without lymphadenopathy.   HEART:  Regular rate and rhythm. normal S1 and S2, No M/R/G  LUNGS:  Good ins/exp effort, no W/R/R/C  ABDOMEN:  Soft, nontender, nondistended with good bowel sounds heard  EXTREMITIES:  Without cyanosis, clubbing or edema.   NEUROLOGICAL:  Gross nonfocal     Labs:   CBC Full  -  ( 02 Jan 2024 14:38 )  WBC Count : 4.97 K/uL  RBC Count : 4.90 M/uL  Hemoglobin : 15.2 g/dL  Hematocrit : 43.7 %  Platelet Count - Automated : 280 K/uL    Urinalysis Basic - ( 03 Jan 2024 07:50 )    Color: x / Appearance: x / SG: x / pH: x  Gluc: 156 mg/dL / Ketone: x  / Bili: x / Urobili: x   Blood: x / Protein: x / Nitrite: x   Leuk Esterase: x / RBC: x / WBC x   Sq Epi: x / Non Sq Epi: x / Bacteria: x      01-03    136  |  104  |  13  ----------------------------<  156<H>  4.3   |  30  |  0.65    Ca    8.9      03 Jan 2024 07:50  Phos  3.2     01-02  Mg     2.1     01-02    TPro  7.8  /  Alb  3.6  /  TBili  0.3  /  DBili  x   /  AST  28  /  ALT  35  /  AlkPhos  72  01-02    # Numbness   - CVA has been ruled our  - ESR -  normal     # DMI  -

## 2024-01-03 NOTE — DISCHARGE NOTE PROVIDER - NSDCCPCAREPLAN_GEN_ALL_CORE_FT
PRINCIPAL DISCHARGE DIAGNOSIS  Diagnosis: Lyme meningitis  Assessment and Plan of Treatment: What is neurologic Lyme disease? Neurologic symptoms of Lyme disease occur when the Lyme disease bacteria affect the peripheral or central nervous systems.  --Cranial nerve involvement: When the cranial nerves are affected, facial palsy (droop) can occur on one or both sides of the face.  --Peripheral nerve involvement: When the peripheral nerves are affected, patients can develop radiculoneuropathy which can cause numbness, tingling, “shooting” pain, or weakness in the arms or legs.  --Central nervous system involvement: When the central nervous system is affected, Lyme meningitis can cause fever, headache, sensitivity to light, and stiff neck.  While in the hospital, we started treatment with intravenous antibiotics and steroids.   You will continue Ceftriaxone 1 gram intravenously once per day for 3 additional weeks.   Please follow up with the Neurologist and your Primary Care Doctor within 2 weeks of discharge.     PRINCIPAL DISCHARGE DIAGNOSIS  Diagnosis: Lyme meningitis  Assessment and Plan of Treatment: What is neurologic Lyme disease? Neurologic symptoms of Lyme disease occur when the Lyme disease bacteria affect the peripheral or central nervous systems.  --Cranial nerve involvement: When the cranial nerves are affected, facial palsy (droop) can occur on one or both sides of the face.  --Peripheral nerve involvement: When the peripheral nerves are affected, patients can develop radiculoneuropathy which can cause numbness, tingling, “shooting” pain, or weakness in the arms or legs.  --Central nervous system involvement: When the central nervous system is affected, Lyme meningitis can cause fever, headache, sensitivity to light, and stiff neck.  While in the hospital, we started treatment with intravenous antibiotics and steroids.   You will continue Ceftriaxone 2 gram intravenously once per day for 3 additional weeks.   In addition, you will start a 6 day Prednisone oral taper tomorrow. Please follow instructions below:  1/09/24: Please take Prednisone 50 mg once   1/10/24: Please take Prednisone 50 mg once   1/11/24: Please take Prednisone 40 mg once   1/12/24: Please take Prednisone 30 mg once   1/13/24: Please take Prednisone 20 mg once   1/14/24: Please take Prednisone 10 mg once   Please take Protonix while on steroid taper to protect your stomach.  Please follow up with the Neurologist and your Primary Care Doctor within 2 weeks of discharge. You will review the lumbar puncture studies that are still pending at time of discharge.  Please review inpatient labs with Dr. Yeboah at time of follow up. You had a positive MARIO (anti-nuclear antibody) value. This finding is non-specific, therefore a positive MARIO reading may indicate a need for follow up blood tests with a Rheumatologist.      SECONDARY DISCHARGE DIAGNOSES  Diagnosis: Chronic cervical pain  Assessment and Plan of Treatment: Please continue to follow up with your Neurosurgeon, Dr. Denton.

## 2024-01-03 NOTE — PHYSICAL THERAPY INITIAL EVALUATION ADULT - PERTINENT HX OF CURRENT PROBLEM, REHAB EVAL
The patient is a 58y Male with significant PMH of DM-1 on insulin pump, Hypothyroidism, HTN and HPL presented in ED with c/o weakness to right face, inability to close right eyes, intermittent double and blurry vision of right eye since yesterday morning. He also has some slurred and garbled speech per his wife. He also feels numbness to right side of his tongue. His gait was also off balance. He also has some upper back pain for last 7-10 days, and he was seen here in ED on Dec 26, CT and MRI unremarkable The patient is a 58y Male with significant PMH of DM-1 on insulin pump, Hypothyroidism, HTN and HPL presented in ED with c/o weakness to right face, inability to close right eyes, intermittent double and blurry vision of right eye since yesterday morning. He also has some slurred and garbled speech per his wife. He also feels numbness to right side of his tongue. His gait was also off balance. He also has some upper back pain for last 7-10 days, and he was seen here in ED on Dec 26, CT and MRI unremarkable, Pt is c/o facial weakness and numbness, slurred speech, no extremity weakness, c/o back pain which started recently

## 2024-01-03 NOTE — DISCHARGE NOTE PROVIDER - PROVIDER TOKENS
PROVIDER:[TOKEN:[[id number]]],PROVIDER:[TOKEN:[[id number]]] PROVIDER:[TOKEN:[[id number]]],PROVIDER:[TOKEN:[[id number]]] PROVIDER:[TOKEN:[[id number]],FOLLOWUP:[2 weeks]],PROVIDER:[TOKEN:[[id number]],FOLLOWUP:[1 month]],PROVIDER:[TOKEN:[[id number]],FOLLOWUP:[2 weeks]] PROVIDER:[TOKEN:[[id number]],FOLLOWUP:[2 weeks]],PROVIDER:[TOKEN:[[id number]],FOLLOWUP:[1 month]],PROVIDER:[TOKEN:[[id number]],FOLLOWUP:[2 weeks]]

## 2024-01-03 NOTE — OCCUPATIONAL THERAPY INITIAL EVALUATION ADULT - MD ORDER
"OT Evaluate and Treat"- MD orders received. Chart reviewed, contents noted, conferred with RN "OT Evaluate and Treat"- MD orders received. Chart reviewed, contents noted, conferred with TAMI Velazquez

## 2024-01-04 LAB
ADD ON TEST-SPECIMEN IN LAB: SIGNIFICANT CHANGE UP
ANA PAT FLD IF-IMP: ABNORMAL
ANA PAT FLD IF-IMP: ABNORMAL
ANA TITR SER: ABNORMAL
ANA TITR SER: ABNORMAL
ANION GAP SERPL CALC-SCNC: 4 MMOL/L — LOW (ref 5–17)
ANION GAP SERPL CALC-SCNC: 4 MMOL/L — LOW (ref 5–17)
BABESIA MICROTI PCR, BLD RESULT: SIGNIFICANT CHANGE UP
BABESIA MICROTI PCR, BLD RESULT: SIGNIFICANT CHANGE UP
BASOPHILS # BLD AUTO: 0.03 K/UL — SIGNIFICANT CHANGE UP (ref 0–0.2)
BASOPHILS # BLD AUTO: 0.03 K/UL — SIGNIFICANT CHANGE UP (ref 0–0.2)
BASOPHILS NFR BLD AUTO: 0.5 % — SIGNIFICANT CHANGE UP (ref 0–2)
BASOPHILS NFR BLD AUTO: 0.5 % — SIGNIFICANT CHANGE UP (ref 0–2)
BUN SERPL-MCNC: 12 MG/DL — SIGNIFICANT CHANGE UP (ref 7–23)
BUN SERPL-MCNC: 12 MG/DL — SIGNIFICANT CHANGE UP (ref 7–23)
CALCIUM SERPL-MCNC: 9 MG/DL — SIGNIFICANT CHANGE UP (ref 8.5–10.1)
CALCIUM SERPL-MCNC: 9 MG/DL — SIGNIFICANT CHANGE UP (ref 8.5–10.1)
CHLORIDE SERPL-SCNC: 104 MMOL/L — SIGNIFICANT CHANGE UP (ref 96–108)
CHLORIDE SERPL-SCNC: 104 MMOL/L — SIGNIFICANT CHANGE UP (ref 96–108)
CO2 SERPL-SCNC: 30 MMOL/L — SIGNIFICANT CHANGE UP (ref 22–31)
CO2 SERPL-SCNC: 30 MMOL/L — SIGNIFICANT CHANGE UP (ref 22–31)
CREAT SERPL-MCNC: 0.68 MG/DL — SIGNIFICANT CHANGE UP (ref 0.5–1.3)
CREAT SERPL-MCNC: 0.68 MG/DL — SIGNIFICANT CHANGE UP (ref 0.5–1.3)
CSF PCR RESULT: SIGNIFICANT CHANGE UP
CSF PCR RESULT: SIGNIFICANT CHANGE UP
EGFR: 107 ML/MIN/1.73M2 — SIGNIFICANT CHANGE UP
EGFR: 107 ML/MIN/1.73M2 — SIGNIFICANT CHANGE UP
EOSINOPHIL # BLD AUTO: 0.04 K/UL — SIGNIFICANT CHANGE UP (ref 0–0.5)
EOSINOPHIL # BLD AUTO: 0.04 K/UL — SIGNIFICANT CHANGE UP (ref 0–0.5)
EOSINOPHIL NFR BLD AUTO: 0.7 % — SIGNIFICANT CHANGE UP (ref 0–6)
EOSINOPHIL NFR BLD AUTO: 0.7 % — SIGNIFICANT CHANGE UP (ref 0–6)
GLUCOSE BLDC GLUCOMTR-MCNC: 133 MG/DL — HIGH (ref 70–99)
GLUCOSE BLDC GLUCOMTR-MCNC: 133 MG/DL — HIGH (ref 70–99)
GLUCOSE BLDC GLUCOMTR-MCNC: 157 MG/DL — HIGH (ref 70–99)
GLUCOSE BLDC GLUCOMTR-MCNC: 157 MG/DL — HIGH (ref 70–99)
GLUCOSE BLDC GLUCOMTR-MCNC: 191 MG/DL — HIGH (ref 70–99)
GLUCOSE BLDC GLUCOMTR-MCNC: 191 MG/DL — HIGH (ref 70–99)
GLUCOSE BLDC GLUCOMTR-MCNC: 193 MG/DL — HIGH (ref 70–99)
GLUCOSE BLDC GLUCOMTR-MCNC: 193 MG/DL — HIGH (ref 70–99)
GLUCOSE SERPL-MCNC: 156 MG/DL — HIGH (ref 70–99)
GLUCOSE SERPL-MCNC: 156 MG/DL — HIGH (ref 70–99)
HCT VFR BLD CALC: 42.6 % — SIGNIFICANT CHANGE UP (ref 39–50)
HCT VFR BLD CALC: 42.6 % — SIGNIFICANT CHANGE UP (ref 39–50)
HGB BLD-MCNC: 14.5 G/DL — SIGNIFICANT CHANGE UP (ref 13–17)
HGB BLD-MCNC: 14.5 G/DL — SIGNIFICANT CHANGE UP (ref 13–17)
IMM GRANULOCYTES NFR BLD AUTO: 1.1 % — HIGH (ref 0–0.9)
IMM GRANULOCYTES NFR BLD AUTO: 1.1 % — HIGH (ref 0–0.9)
LYMPHOCYTES # BLD AUTO: 1.44 K/UL — SIGNIFICANT CHANGE UP (ref 1–3.3)
LYMPHOCYTES # BLD AUTO: 1.44 K/UL — SIGNIFICANT CHANGE UP (ref 1–3.3)
LYMPHOCYTES # BLD AUTO: 26.1 % — SIGNIFICANT CHANGE UP (ref 13–44)
LYMPHOCYTES # BLD AUTO: 26.1 % — SIGNIFICANT CHANGE UP (ref 13–44)
MCHC RBC-ENTMCNC: 30.3 PG — SIGNIFICANT CHANGE UP (ref 27–34)
MCHC RBC-ENTMCNC: 30.3 PG — SIGNIFICANT CHANGE UP (ref 27–34)
MCHC RBC-ENTMCNC: 34 GM/DL — SIGNIFICANT CHANGE UP (ref 32–36)
MCHC RBC-ENTMCNC: 34 GM/DL — SIGNIFICANT CHANGE UP (ref 32–36)
MCV RBC AUTO: 89.1 FL — SIGNIFICANT CHANGE UP (ref 80–100)
MCV RBC AUTO: 89.1 FL — SIGNIFICANT CHANGE UP (ref 80–100)
MONOCYTES # BLD AUTO: 0.54 K/UL — SIGNIFICANT CHANGE UP (ref 0–0.9)
MONOCYTES # BLD AUTO: 0.54 K/UL — SIGNIFICANT CHANGE UP (ref 0–0.9)
MONOCYTES NFR BLD AUTO: 9.8 % — SIGNIFICANT CHANGE UP (ref 2–14)
MONOCYTES NFR BLD AUTO: 9.8 % — SIGNIFICANT CHANGE UP (ref 2–14)
NEUTROPHILS # BLD AUTO: 3.41 K/UL — SIGNIFICANT CHANGE UP (ref 1.8–7.4)
NEUTROPHILS # BLD AUTO: 3.41 K/UL — SIGNIFICANT CHANGE UP (ref 1.8–7.4)
NEUTROPHILS NFR BLD AUTO: 61.8 % — SIGNIFICANT CHANGE UP (ref 43–77)
NEUTROPHILS NFR BLD AUTO: 61.8 % — SIGNIFICANT CHANGE UP (ref 43–77)
PLATELET # BLD AUTO: 283 K/UL — SIGNIFICANT CHANGE UP (ref 150–400)
PLATELET # BLD AUTO: 283 K/UL — SIGNIFICANT CHANGE UP (ref 150–400)
POTASSIUM SERPL-MCNC: 4 MMOL/L — SIGNIFICANT CHANGE UP (ref 3.5–5.3)
POTASSIUM SERPL-MCNC: 4 MMOL/L — SIGNIFICANT CHANGE UP (ref 3.5–5.3)
POTASSIUM SERPL-SCNC: 4 MMOL/L — SIGNIFICANT CHANGE UP (ref 3.5–5.3)
POTASSIUM SERPL-SCNC: 4 MMOL/L — SIGNIFICANT CHANGE UP (ref 3.5–5.3)
RBC # BLD: 4.78 M/UL — SIGNIFICANT CHANGE UP (ref 4.2–5.8)
RBC # BLD: 4.78 M/UL — SIGNIFICANT CHANGE UP (ref 4.2–5.8)
RBC # FLD: 13 % — SIGNIFICANT CHANGE UP (ref 10.3–14.5)
RBC # FLD: 13 % — SIGNIFICANT CHANGE UP (ref 10.3–14.5)
SODIUM SERPL-SCNC: 138 MMOL/L — SIGNIFICANT CHANGE UP (ref 135–145)
SODIUM SERPL-SCNC: 138 MMOL/L — SIGNIFICANT CHANGE UP (ref 135–145)
WBC # BLD: 5.61 K/UL — SIGNIFICANT CHANGE UP (ref 3.8–10.5)
WBC # BLD: 5.61 K/UL — SIGNIFICANT CHANGE UP (ref 3.8–10.5)
WBC # FLD AUTO: 5.61 K/UL — SIGNIFICANT CHANGE UP (ref 3.8–10.5)
WBC # FLD AUTO: 5.61 K/UL — SIGNIFICANT CHANGE UP (ref 3.8–10.5)

## 2024-01-04 PROCEDURE — 99232 SBSQ HOSP IP/OBS MODERATE 35: CPT

## 2024-01-04 RX ORDER — CEFTRIAXONE 500 MG/1
2000 INJECTION, POWDER, FOR SOLUTION INTRAMUSCULAR; INTRAVENOUS EVERY 12 HOURS
Refills: 0 | Status: DISCONTINUED | OUTPATIENT
Start: 2024-01-04 | End: 2024-01-04

## 2024-01-04 RX ORDER — CEFTRIAXONE 500 MG/1
2000 INJECTION, POWDER, FOR SOLUTION INTRAMUSCULAR; INTRAVENOUS
Refills: 0 | Status: DISCONTINUED | OUTPATIENT
Start: 2024-01-05 | End: 2024-01-08

## 2024-01-04 RX ADMIN — Medication 110 MILLIGRAM(S): at 05:36

## 2024-01-04 RX ADMIN — ONDANSETRON 4 MILLIGRAM(S): 8 TABLET, FILM COATED ORAL at 09:53

## 2024-01-04 RX ADMIN — Medication 81 MILLIGRAM(S): at 09:25

## 2024-01-04 RX ADMIN — CEFTRIAXONE 2000 MILLIGRAM(S): 500 INJECTION, POWDER, FOR SOLUTION INTRAMUSCULAR; INTRAVENOUS at 10:00

## 2024-01-04 RX ADMIN — LISINOPRIL 20 MILLIGRAM(S): 2.5 TABLET ORAL at 09:25

## 2024-01-04 RX ADMIN — ATORVASTATIN CALCIUM 40 MILLIGRAM(S): 80 TABLET, FILM COATED ORAL at 21:18

## 2024-01-04 RX ADMIN — Medication 50 MICROGRAM(S): at 05:35

## 2024-01-04 RX ADMIN — Medication 650 MILLIGRAM(S): at 01:45

## 2024-01-04 RX ADMIN — Medication 40 MILLIGRAM(S): at 21:18

## 2024-01-04 RX ADMIN — Medication 40 MILLIGRAM(S): at 12:03

## 2024-01-04 NOTE — DIETITIAN INITIAL EVALUATION ADULT - ADD RECOMMEND
1) Change consistency of diet to easy-to-chew as per SLP ; liberalize diet to CHO Consistent diet ONLY to maximize caloric and nutrient intake.  2) Add premier protein shake 1x/day (provides 160kcal, 30g protein)  3) Encourage protein-rich foods, maximize food preferences  4) Monitor bowel movements, if no BM for >3 days, consider implementing bowel regimen.  5) Maintain aspiration precautions, back of bed >35 degrees.  6) Monitor and optimize BG levels between 140-180 mg/dL by medical management  7) Monitor lytes/ min and replete prn.  8) MVI w/ minerals daily to ensure 100% RDA met  9) Confirm goals of care regarding nutrition support  RD will continue to monitor PO intake, labs, hydration, and wt prn.

## 2024-01-04 NOTE — SWALLOW BEDSIDE ASSESSMENT ADULT - COMMENTS
The pt was admitted to  with blurry vision/diplopia and right facial weakness which has progressed to left side of his face as well. Brain MRI negative. ESR is normal. Blood + for Lyme Disease. LP findings pending. This profile is superimposed upon a history of DM, HLD, HTN, hypothyroidism, prior fall with lumbar fracture and past right finger injuring warranting surgery.

## 2024-01-04 NOTE — PHARMACOTHERAPY INTERVENTION NOTE - COMMENTS
Medication reconciliation completed.  Reviewed Medication list and confirmed med allergies with patient; confirmed with Dr. First MedHx.  Pt uses Medtronic 770g Insulin Pump with the current settings:    Basal Rate in u/hr:  12am - 2am : 1.0  2am - 5am : 2.3  5am - 8am : 2.5  8am - 5pm : 1.7  5pm - 10pm : 1.4  10pm - 12am : 1.0    Carb Ratio in g/u:  12am - 12pm : 4.0  12pm - 4pm : 5.0  4pm - 12am : 6.0    Sensitivity Factor in mg/dL:  12am - 7am : 30  7am - 12pm : 25    Target levels:   90 - 130
Adjust dose to 2 grams daily for lyme meningitis

## 2024-01-04 NOTE — SWALLOW BEDSIDE ASSESSMENT ADULT - SWALLOW EVAL: DIAGNOSIS
1) On encounter, bilateral facial/labial weakness were apparent on right > left. Pt unable to completely close his eyes. The pt was alert and interactive. He was able to verbalize during communicative probes. At these times, pt's speech output was marked by reduced articulatory pressure when attempting to produce labial plosive phonemes indicative of acute functional Dysarthria. Speech intelligibility was approximately 75%. His underlying verbalizations were linguistically intact/contextually appropriate. The pt's functional Dysarthria is in setting of bilateral Bell's Palsy(on right > left). Facial weakness possibly due to Lyme Disease.

## 2024-01-04 NOTE — SWALLOW BEDSIDE ASSESSMENT ADULT - NS SPL SWALLOW CLINIC TRIAL FT
The pt exhibited Oral Dysphagia of mild to moderate severity which subjectively appeared to be a grossly functional condition. Labial grading on utensils was reduced(on right > left), bolus formation/transfer were mildly to moderately prolonged/disorganized but grossly mechanically functional, & mild pooling in anterior sulci evident on right > left with foods requiring mastication. Piecemeal oral processing was evident. Pt able to clear oral debris following a lingual sweep. Amount of oral debris increased as viscosity of given food consistencies increased. Pt's Oral Dysphagia is in setting of bilateral Bell's Palsy(on right > left). Facial weakness possibly due to Lyme Disease. Of note is that pt's underlying pharyngeal integrity subjectively appeared to be functional for age, laryngeal lift on palpation during swallowing trials was functional for age as well and NO behavioral aspiration signs exhibited on exam. No change in O2 sats noted. Odynophagia was denied.

## 2024-01-04 NOTE — DIETITIAN INITIAL EVALUATION ADULT - NSICDXPASTMEDICALHX_GEN_ALL_CORE_FT
PAST MEDICAL HISTORY:  HLD (hyperlipidemia)     HTN (hypertension)     Insulin pump status     Type 1 diabetes mellitus

## 2024-01-04 NOTE — PROGRESS NOTE ADULT - SUBJECTIVE AND OBJECTIVE BOX
The patient is a 58y Male with significant PMH of DM-1 on insulin pump, Hypothyroidism, HTN and HPL presented in ED with c/o weakness to right face, inability to close right eyes, intermittent double and blurry vision of right eye since today morning.  Patient's wife Ms. Burrell present at bedside in ED. He also has some slurred and garbled speech per his wife. He also feels numbness to right side of his tongue. His gait was also off balance. He also has some upper back pain for last 7-10 days, and he was seen here in ED on Dec 26, and he was given Methocarbamol and Diclofenac which he was taking with any relief in his upper back pain. He denies any fall or injury to his spine.  Today he was seen in an office as outpatient by an Orthopedic Spine surgeon Dr. Jorden Blas, and he was referred to ED due to blurry vision and diplopia. Upon arrival in ED, his NIH SS was 2. No code stroke in ED. His CBC and CMP unremarkable except BS of 242. Mg, Troponin and Phosphorus level wnl. UA negative.  CT angio Head and Neck unremarkable.  CT angio Chest, abdomen and Pelvis negative for any acute process negative aortic syndrome.    - Feeling of pain getting backpain (2/10).   - "Facial numbness - or maybe a paralysis" -  less sensory > motor " centered around lips eyelid and eyebrows. unable to close eyes.     REVIEW OF SYSTEMS:  General: NAD, hemodynamically stable   HEENT:  Eyes:  No visual loss, blurred vision, double vision or yellow sclerae. Ears, Nose, Throat:  No hearing loss, sneezing, congestion, runny nose or sore throat.  SKIN:  No rash or itching.  CARDIOVASCULAR:  No chest pain, chest pressure or chest discomfort. No palpitations or edema.  RESPIRATORY:  No shortness of breath, cough or sputum.  GASTROINTESTINAL:  No anorexia, nausea, vomiting or diarrhea. No abdominal pain or blood.  NEUROLOGICAL:  No headache, dizziness, syncope, paralysis, ataxia, numbness or tingling in the extremities. No change in bowel or bladder control.  MUSCULOSKELETAL:  No muscle, back pain, joint pain or stiffness.  HEMATOLOGIC:  No anemia, bleeding or bruising.  LYMPHATICS:  No enlarged nodes. No history of splenectomy.  ENDOCRINOLOGIC:  No reports of sweating, cold or heat intolerance. No polyuria or polydipsia.  ALLERGIES:  No history of asthma, hives, eczema or rhinitis.    Physical Exam:   GENERAL APPEARANCE:  NAD, hemodynamically stable  T(C): 36.4 (01-03-24 @ 09:13), Max: 36.8 (01-02-24 @ 19:00)  HR: 58 (01-03-24 @ 09:13) (58 - 93)  BP: 138/60 (01-03-24 @ 09:13) (138/60 - 167/81)  RR: 18 (01-03-24 @ 09:13) (18 - 18)  SpO2: 98% (01-03-24 @ 09:13) (97% - 100%)  HEENT:  Head is normocephalic    Skin:  Warm and dry without any rash   NECK:  Supple without lymphadenopathy.   HEART:  Regular rate and rhythm. normal S1 and S2, No M/R/G  LUNGS:  Good ins/exp effort, no W/R/R/C  ABDOMEN:  Soft, nontender, nondistended with good bowel sounds heard  EXTREMITIES:  Without cyanosis, clubbing or edema.   NEUROLOGICAL:  Gross nonfocal     Labs:   CBC Full  -  ( 02 Jan 2024 14:38 )  WBC Count : 4.97 K/uL  RBC Count : 4.90 M/uL  Hemoglobin : 15.2 g/dL  Hematocrit : 43.7 %  Platelet Count - Automated : 280 K/uL    Urinalysis Basic - ( 03 Jan 2024 07:50 )    Color: x / Appearance: x / SG: x / pH: x  Gluc: 156 mg/dL / Ketone: x  / Bili: x / Urobili: x   Blood: x / Protein: x / Nitrite: x   Leuk Esterase: x / RBC: x / WBC x   Sq Epi: x / Non Sq Epi: x / Bacteria: x      01-03    136  |  104  |  13  ----------------------------<  156<H>  4.3   |  30  |  0.65    Ca    8.9      03 Jan 2024 07:50  Phos  3.2     01-02  Mg     2.1     01-02    TPro  7.8  /  Alb  3.6  /  TBili  0.3  /  DBili  x   /  AST  28  /  ALT  35  /  AlkPhos  72  01-02    # B/L 7th nerve palsy /  trouble swallowing  /  trouble talking  # ? Stage III lymes? vs West Nile  - CVA has been ruled our  - ESR -  normal   - Abnormal LP with high WBC  - Neurology following    # DMI  - continue with DM pump    # DVT proph

## 2024-01-04 NOTE — CONSULT NOTE ADULT - ASSESSMENT
59 y/o Male with h/o DM-1 on insulin pump, Hypothyroidism, HTN and HPL was admitted on 1/24 for b/l weakness to right face, inability to close right eyes, intermittent double and blurry vision of right eye x one day. He also has some slurred and garbled speech per his wife. He also feels numbness to right side of his tongue. His gait was also off balance. He also has some upper back pain for last 7-10 days, and he was seen here in ED on Dec 26, and he was given Methocarbamol and Diclofenac which he was taking with any relief in his upper back pain. He was seen in an office as outpatient by an Orthopedic Spine surgeon Dr. Jorden Blas, and he was referred to ED due to blurry vision and diplopia. On 1/3 he ws noted with Lyme AB detected and given doxycycline, then ceftriaxone.     1. Lyme disease. B/l facial palsy. Acute meningitis. 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  -CSF analysis reviewed  -probable tick exposure several weeks/ months ago  -start ceftriaxone 2 gm IV qd  -reason for abx use and side effects reviewed with patient; monitor BMP   -neurology evaluation appreciated  -steroids per neuro  -f/u Lyme CSF serology  -f/u cultures  -old chart reviewed to assess prior cultures  -monitor temps  -f/u CBC  -supportive care  2. Other issues:   -care per medicine    d/w wife and daughter at bedside  All questions answered     57 y/o Male with h/o DM-1 on insulin pump, Hypothyroidism, HTN and HPL was admitted on 1/24 for b/l weakness to right face, inability to close right eyes, intermittent double and blurry vision of right eye x one day. He also has some slurred and garbled speech per his wife. He also feels numbness to right side of his tongue. His gait was also off balance. He also has some upper back pain for last 7-10 days, and he was seen here in ED on Dec 26, and he was given Methocarbamol and Diclofenac which he was taking with any relief in his upper back pain. He was seen in an office as outpatient by an Orthopedic Spine surgeon Dr. Jorden Blas, and he was referred to ED due to blurry vision and diplopia. On 1/3 he ws noted with Lyme AB detected and given doxycycline, then ceftriaxone.     1. Lyme disease. B/l facial palsy. Acute meningitis. 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  -CSF analysis reviewed  -probable tick exposure several weeks/ months ago  -start ceftriaxone 2 gm IV qd  -reason for abx use and side effects reviewed with patient; monitor BMP   -neurology evaluation appreciated  -steroids per neuro  -f/u Lyme CSF serology  -f/u cultures  -old chart reviewed to assess prior cultures  -monitor temps  -f/u CBC  -supportive care  2. Other issues:   -care per medicine    d/w wife and daughter at bedside  All questions answered

## 2024-01-04 NOTE — PROGRESS NOTE ADULT - ASSESSMENT
Mr. Miranda is a 58y Male with significant PMH of DM-1 on insulin pump, Hypothyroidism, HTN and HPL presented in ED with right sided facial weakness, intermittent double and blurry vision of the right eye, val-oraland right sided tongue numbness which started on 1/1. He came to the ED on 1/2 after being sent by orthopedics when he went for an outpatient visit.   Facial weakness has progressed to the right side as well.    Multiple cranial neuropathies:  -MRI brain is negative for stroke, acute pathology  -No significant stenosis, vascular malformations on CTA  -F/U MRI brain with contrast results  -Serum is positive for Lyme - IgG > IgM likely indicating a non-acute infection  -Suspicion for neuro-lyme causing cranial neuropathies  -LP performed - increased WBC with lymphocyte predominance and negative PCR. CSF lyme pending.  -Started on doxycycline and changed to IV ceftriaxone.  -Will f/u ID consult.  -Speech pathology appreciated  -f/u remainder of CSF results.

## 2024-01-04 NOTE — SWALLOW BEDSIDE ASSESSMENT ADULT - SLP GENERAL OBSERVATIONS
On encounter, bilateral facial weakness was apparent on right > left. Pt unable to completely close his eyes. The pt was alert and interactive. He was able to verbalize during communicative probes. At these times, pt's speech output was marked by reduced articulatory pressure when attempting to produce labial plosive phonemes indicative of acute functional Dysarthria. His underlying verbalizations were linguistically intact/contextually appropriate. The pt's functional Dysarthria is in setting of bilateral Bell's Palsy(on right > left). Facial weakness possibly due to Lyme Disease.

## 2024-01-04 NOTE — CONSULT NOTE ADULT - SUBJECTIVE AND OBJECTIVE BOX
Patient is a 58y old  Male who presents with a chief complaint of Cerebral infarction    HPI:  57 y/o Male with h/o DM-1 on insulin pump, Hypothyroidism, HTN and HPL was admitted on 1/24 for b/l weakness to right face, inability to close right eyes, intermittent double and blurry vision of right eye x one day. He also has some slurred and garbled speech per his wife. He also feels numbness to right side of his tongue. His gait was also off balance. He also has some upper back pain for last 7-10 days, and he was seen here in ED on Dec 26, and he was given Methocarbamol and Diclofenac which he was taking with any relief in his upper back pain. He was seen in an office as outpatient by an Orthopedic Spine surgeon Dr. Jorden Blas, and he was referred to ED due to blurry vision and diplopia. On 1/3 he ws noted with with Lyme AB detected and given doxycycline, then ceftriaxone.   He had an LP yesterday.    PMH: as above  PSH: as above  Meds: per reconciliation sheet, noted below  MEDICATIONS  (STANDING):  aspirin enteric coated 81 milliGRAM(s) Oral daily  atorvastatin 40 milliGRAM(s) Oral at bedtime  dextrose 5%. 1000 milliLiter(s) (50 mL/Hr) IV Continuous <Continuous>  dextrose 5%. 1000 milliLiter(s) (100 mL/Hr) IV Continuous <Continuous>  dextrose 50% Injectable 25 Gram(s) IV Push once  dextrose 50% Injectable 12.5 Gram(s) IV Push once  dextrose 50% Injectable 25 Gram(s) IV Push once  enoxaparin Injectable 40 milliGRAM(s) SubCutaneous every 24 hours  glucagon  Injectable 1 milliGRAM(s) IntraMuscular once  insulin lispro (HumaLOG) Pump 1 Each SubCutaneous Continuous Pump  levothyroxine 50 MICROGram(s) Oral daily  lisinopril 20 milliGRAM(s) Oral daily  methylPREDNISolone sodium succinate Injectable 40 milliGRAM(s) IV Push every 12 hours  sodium chloride 0.9%. 1000 milliLiter(s) (125 mL/Hr) IV Continuous <Continuous>  sodium chloride 0.9%. 1000 milliLiter(s) (75 mL/Hr) IV Continuous <Continuous>    MEDICATIONS  (PRN):  acetaminophen     Tablet .. 650 milliGRAM(s) Oral every 6 hours PRN Mild Pain (1 - 3)  dextrose Oral Gel 15 Gram(s) Oral once PRN Blood Glucose LESS THAN 70 milliGRAM(s)/deciliter  ondansetron Injectable 4 milliGRAM(s) IV Push every 8 hours PRN Nausea and/or Vomiting    Allergies    No Known Allergies    Intolerances      Social: no smoking, no alcohol, no illegal drugs; no recent travel, no exposure to TB  FAMILY HISTORY:  FH: breast cancer (Mother)      no history of premature cardiovascular disease in first degree relatives    ROS: the patient denies fever, no chills, no HA, no seizures, no dizziness, no sore throat, no nasal congestion, no blurry vision, no CP, no palpitations, no SOB, no cough, no abdominal pain, no diarrhea, no N/V, no dysuria, no leg pain, no claudication, no rash, no joint aches, no rectal pain or bleeding, no night sweats, has facial weakness  All other systems reviewed and are negative    Vital Signs Last 24 Hrs  T(C): 36.3 (04 Jan 2024 08:21), Max: 36.4 (03 Jan 2024 16:28)  T(F): 97.4 (04 Jan 2024 08:21), Max: 97.6 (03 Jan 2024 23:30)  HR: 64 (04 Jan 2024 08:21) (61 - 81)  BP: 159/74 (04 Jan 2024 08:21) (141/59 - 159/74)  BP(mean): --  RR: 18 (04 Jan 2024 08:21) (18 - 18)  SpO2: 97% (04 Jan 2024 08:21) (97% - 98%)    Parameters below as of 04 Jan 2024 08:21  Patient On (Oxygen Delivery Method): room air    PE:    Constitutional:  No acute distress  HEENT: NC/AT, EOMI, PERRLA, conjunctivae clear; ears and nose atraumatic; pharynx benign  b/l facial palsy  Neck: supple; thyroid not palpable  Back: no tenderness  Respiratory: respiratory effort normal; clear to auscultation  Cardiovascular: S1S2 regular, no murmurs  Abdomen: soft, not tender, not distended, positive BS; no liver or spleen organomegaly  Genitourinary: no suprapubic tenderness  Lymphatic: no LN palpable  Musculoskeletal: no muscle tenderness, no joint swelling or tenderness  Extremities: no pedal edema  Neurological/ Psychiatric: AxOx3, judgement and insight normal; moving all extremities  Skin: no rashes; no palpable lesions    Labs: all available labs reviewed                        14.5   5.61  )-----------( 283      ( 04 Jan 2024 07:54 )             42.6     01-04    138  |  104  |  12  ----------------------------<  156<H>  4.0   |  30  |  0.68    Ca    9.0      04 Jan 2024 07:54  Phos  3.2     01-02  Mg     2.1     01-02    TPro  7.8  /  Alb  3.6  /  TBili  0.3  /  DBili  x   /  AST  28  /  ALT  35  /  AlkPhos  72  01-02     LIVER FUNCTIONS - ( 02 Jan 2024 14:38 )  Alb: 3.6 g/dL / Pro: 7.8 gm/dL / ALK PHOS: 72 U/L / ALT: 35 U/L / AST: 28 U/L / GGT: x           Lyme IG G and IgM detected   Babesia microti PCR, Bld (collected 03 Jan 2024 21:39)    Culture - CSF with Gram Stain (collected 03 Jan 2024 19:00)  Source: .CSF  Gram Stain (03 Jan 2024 23:31):    polymorphonuclear leukocytes seen    No organisms seen    by cytocentrifuge    Culture - Urine (collected 02 Jan 2024 16:15)  Source: Clean Catch Clean Catch (Midstream)  Final Report (03 Jan 2024 14:56):    No growth    Radiology: all available radiological tests reviewed    Advanced directives addressed: full resuscitation

## 2024-01-04 NOTE — SWALLOW BEDSIDE ASSESSMENT ADULT - ADDITIONAL RECOMMENDATIONS
HOSPITALIST, NEURO AND ID FOLLOW UP. LYME DISEASE CAN EXPLAIN BILATERAL BELLS PALSY. LP FINDINGS PENDING.

## 2024-01-04 NOTE — DIETITIAN INITIAL EVALUATION ADULT - PERTINENT MEDS FT
MEDICATIONS  (STANDING):  aspirin enteric coated 81 milliGRAM(s) Oral daily  atorvastatin 40 milliGRAM(s) Oral at bedtime  cefTRIAXone Injectable. 2000 milliGRAM(s) IV Push every 12 hours  dextrose 5%. 1000 milliLiter(s) (50 mL/Hr) IV Continuous <Continuous>  dextrose 5%. 1000 milliLiter(s) (100 mL/Hr) IV Continuous <Continuous>  dextrose 50% Injectable 25 Gram(s) IV Push once  dextrose 50% Injectable 12.5 Gram(s) IV Push once  dextrose 50% Injectable 25 Gram(s) IV Push once  enoxaparin Injectable 40 milliGRAM(s) SubCutaneous every 24 hours  glucagon  Injectable 1 milliGRAM(s) IntraMuscular once  insulin lispro (HumaLOG) Pump 1 Each SubCutaneous Continuous Pump  levothyroxine 50 MICROGram(s) Oral daily  lisinopril 20 milliGRAM(s) Oral daily  sodium chloride 0.9%. 1000 milliLiter(s) (125 mL/Hr) IV Continuous <Continuous>  sodium chloride 0.9%. 1000 milliLiter(s) (75 mL/Hr) IV Continuous <Continuous>    MEDICATIONS  (PRN):  acetaminophen     Tablet .. 650 milliGRAM(s) Oral every 6 hours PRN Mild Pain (1 - 3)  dextrose Oral Gel 15 Gram(s) Oral once PRN Blood Glucose LESS THAN 70 milliGRAM(s)/deciliter  ondansetron Injectable 4 milliGRAM(s) IV Push every 8 hours PRN Nausea and/or Vomiting

## 2024-01-04 NOTE — SWALLOW BEDSIDE ASSESSMENT ADULT - SWALLOW EVAL: PROGNOSIS
2) The pt exhibits Oral Dysphagia of mild to moderate severity which subjectively appeared to be a grossly functional condition. Labial grading on utensils was reduced(on right > left), bolus formation/transfer were mildly to moderately prolonged/disorganized but grossly mechanically functional, & mild pooling in anterior sulci evident on right > left with foods requiring mastication. Piecemeal oral processing was evident. Pt able to clear oral debris following a lingual sweep. Pt's Oral Dysphagia is in setting of bilateral Bell's Palsy(on right > left). Facial weakness possibly due to Lyme Disease. Note that underlying pharyngeal integrity subjectively appeared to be grossly functional for age. NO behavioral aspiration signs exhibited on exam. No change in O2 sats noted. Odynophagia was denied.

## 2024-01-04 NOTE — PROGRESS NOTE ADULT - SUBJECTIVE AND OBJECTIVE BOX
Interval History:  1/4/24: No new complaints today.  Denies headache or worsening of back pain.  Still with b/l facial weakness    MEDICATIONS  (STANDING):  aspirin enteric coated 81 milliGRAM(s) Oral daily  atorvastatin 40 milliGRAM(s) Oral at bedtime  cefTRIAXone Injectable. 2000 milliGRAM(s) IV Push every 12 hours  dextrose 5%. 1000 milliLiter(s) (50 mL/Hr) IV Continuous <Continuous>  dextrose 5%. 1000 milliLiter(s) (100 mL/Hr) IV Continuous <Continuous>  dextrose 50% Injectable 25 Gram(s) IV Push once  dextrose 50% Injectable 12.5 Gram(s) IV Push once  dextrose 50% Injectable 25 Gram(s) IV Push once  enoxaparin Injectable 40 milliGRAM(s) SubCutaneous every 24 hours  glucagon  Injectable 1 milliGRAM(s) IntraMuscular once  insulin lispro (HumaLOG) Pump 1 Each SubCutaneous Continuous Pump  levothyroxine 50 MICROGram(s) Oral daily  lisinopril 20 milliGRAM(s) Oral daily  sodium chloride 0.9%. 1000 milliLiter(s) (125 mL/Hr) IV Continuous <Continuous>  sodium chloride 0.9%. 1000 milliLiter(s) (75 mL/Hr) IV Continuous <Continuous>    MEDICATIONS  (PRN):  acetaminophen     Tablet .. 650 milliGRAM(s) Oral every 6 hours PRN Mild Pain (1 - 3)  dextrose Oral Gel 15 Gram(s) Oral once PRN Blood Glucose LESS THAN 70 milliGRAM(s)/deciliter  ondansetron Injectable 4 milliGRAM(s) IV Push every 8 hours PRN Nausea and/or Vomiting      Allergies    No Known Allergies    Intolerances        PHYSICAL EXAM:  Vital Signs Last 24 Hrs  T(F): 97.4 (01-04-24 @ 08:21)  HR: 64 (01-04-24 @ 08:21)  BP: 159/74 (01-04-24 @ 08:21)  RR: 18 (01-04-24 @ 08:21)    GENERAL: NAD, well-groomed  HEAD:  Atraumatic, Normocephalic  Neuro:  Awake, alert, no aphasia  CN: PERRL, EOMI, b/l facial weakness both upper and lower - cannot fully close eyes, smile, puff out cheeks. Can protrude tongue. Palate rises symmetrically.   motor: normal tone, no pronator drift, full strength in all four extremities  sensory: intact to light touch  coordination: finger to nose intact bilaterally  DTRs: symmetric, biceps, radialis trace to 1, patellars 1+ b/l    LABS:                        14.5   5.61  )-----------( 283      ( 04 Jan 2024 07:54 )             42.6     01-04    138  |  104  |  12  ----------------------------<  156<H>  4.0   |  30  |  0.68    Ca    9.0      04 Jan 2024 07:54  Phos  3.2     01-02  Mg     2.1     01-02    TPro  7.8  /  Alb  3.6  /  TBili  0.3  /  DBili  x   /  AST  28  /  ALT  35  /  AlkPhos  72  01-02    PT/INR - ( 03 Jan 2024 17:17 )   PT: 11.3 sec;   INR: 1.00 ratio           Urinalysis Basic - ( 04 Jan 2024 07:54 )    Color: x / Appearance: x / SG: x / pH: x  Gluc: 156 mg/dL / Ketone: x  / Bili: x / Urobili: x   Blood: x / Protein: x / Nitrite: x   Leuk Esterase: x / RBC: x / WBC x   Sq Epi: x / Non Sq Epi: x / Bacteria: x        RADIOLOGY & ADDITIONAL STUDIES:    CTA head and neck 1/2/24:  CTA BRAIN: Atheromatous irregularity along the cavernous ICAs   bilaterally. Otherwise, no flow-limiting stenosis or occlusion.    CTANECK: Patent cervical vasculature. No flow limiting stenosis or   occlusion.    MRI head non-contrast 1/2/24:  No acute intracranial hemorrhage or evidence of acute   ischemia.    MR cervical spine 1/3/24:   1. Straightening of lordosis can be seen in the setting of muscle spasm.  2. C3-C4 and C4-C5 disc bulges, resulting in mild left neural foramen   stenosis with uncovertebral spurring.  3. C5-C6 right paracentral disc protrusion superimposed upon a disc   bulge, impinging upon the thecal sac, resulting in mild central canal and   mild left neural foramen stenosis with uncovertebral spurring.  4. No cord compression or gross altered cord signal.  5. Additional findings described in detail above.    Lyme:  Lyme IgM Ab: 0.935 Index (equivocal)  Lyme IgG > 45 (positive)  Lyme IgG/IgM Abs 8.93  Lyme C6 interpretation (positive)    CSF 1/3/24:  nucleated cells 255, 75% lymphocytes  protein 119  Glucose 107  PCR not detected

## 2024-01-04 NOTE — SWALLOW BEDSIDE ASSESSMENT ADULT - SWALLOW EVAL: RECOMMENDED DIET
SUGGEST AN EASY TO CHEW DIET WITH THIN LIQUID TEXTURES AS THE PATIENT APPEARED CLINICALLY TOLERANT OF THESE FOOD CONSISTENCIES FROM AN OROPHARYNGEAL SWALLOWING PERSPECTIVE ON EXAM AND FOOD TEXTURES ON THIS DIET ACCOMMODATES HIS ORAL DYSPHAGIC SEQUEL.

## 2024-01-04 NOTE — DIETITIAN INITIAL EVALUATION ADULT - PERTINENT LABORATORY DATA
01-04    138  |  104  |  12  ----------------------------<  156<H>  4.0   |  30  |  0.68    Ca    9.0      04 Jan 2024 07:54  Phos  3.2     01-02  Mg     2.1     01-02    TPro  7.8  /  Alb  3.6  /  TBili  0.3  /  DBili  x   /  AST  28  /  ALT  35  /  AlkPhos  72  01-02  POCT Blood Glucose.: 133 mg/dL (01-04-24 @ 08:07)  A1C with Estimated Average Glucose Result: 8.0 % (01-03-24 @ 07:50)  A1C with Estimated Average Glucose Result: 7.7 % (01-02-24 @ 22:18)

## 2024-01-04 NOTE — DIETITIAN INITIAL EVALUATION ADULT - ORAL INTAKE PTA/DIET HISTORY
Pt lives at home w/ wife that cooks/grocery shops for household w/ no difficulty. Reports normally good appetite, consumes 3 meals/day consistently, likely meeting adequate intake of >75% of ENN as per 24-hr recall. W/ T1DM (x > 40 years) - on insulin pump; appears to be well-controlled & sees endocrinologist every 6 months outpatient. Takes humalog short-acting and lantus long-acting insulin at home.

## 2024-01-04 NOTE — CONSULT NOTE ADULT - REASON FOR ADMISSION
Right eye unable to close with blurry vision and diplopia.
Right eye unable to close with blurry vision and diplopia.

## 2024-01-05 LAB
ALBUMIN CSF-MCNC: 63.8 MG/DL — HIGH (ref 14–25)
ALBUMIN CSF-MCNC: 63.8 MG/DL — HIGH (ref 14–25)
ALBUMIN SERPL ELPH-MCNC: 3676 MG/DL — SIGNIFICANT CHANGE UP (ref 3500–5200)
ALBUMIN SERPL ELPH-MCNC: 3676 MG/DL — SIGNIFICANT CHANGE UP (ref 3500–5200)
GLUCOSE BLDC GLUCOMTR-MCNC: 163 MG/DL — HIGH (ref 70–99)
GLUCOSE BLDC GLUCOMTR-MCNC: 163 MG/DL — HIGH (ref 70–99)
GLUCOSE BLDC GLUCOMTR-MCNC: 170 MG/DL — HIGH (ref 70–99)
GLUCOSE BLDC GLUCOMTR-MCNC: 170 MG/DL — HIGH (ref 70–99)
GLUCOSE BLDC GLUCOMTR-MCNC: 193 MG/DL — HIGH (ref 70–99)
GLUCOSE BLDC GLUCOMTR-MCNC: 193 MG/DL — HIGH (ref 70–99)
IGG CSF-MCNC: 26.5 MG/DL — HIGH
IGG CSF-MCNC: 26.5 MG/DL — HIGH
IGG FLD-MCNC: 1241 MG/DL — SIGNIFICANT CHANGE UP (ref 610–1660)
IGG FLD-MCNC: 1241 MG/DL — SIGNIFICANT CHANGE UP (ref 610–1660)
IGG SYNTH RATE SER+CSF CALC-MRATE: 81 MG/DAY — HIGH
IGG SYNTH RATE SER+CSF CALC-MRATE: 81 MG/DAY — HIGH
IGG/ALB CLEAR SER+CSF-RTO: 1.2 — HIGH
IGG/ALB CLEAR SER+CSF-RTO: 1.2 — HIGH
IGG/ALB CSF: 0.42 RATIO — HIGH
IGG/ALB CSF: 0.42 RATIO — HIGH
IGG/ALB SER: 0.34 RATIO — SIGNIFICANT CHANGE UP
IGG/ALB SER: 0.34 RATIO — SIGNIFICANT CHANGE UP
PROT S FREE PPP-ACNC: 78 % — SIGNIFICANT CHANGE UP (ref 63–140)
PROT S FREE PPP-ACNC: 78 % — SIGNIFICANT CHANGE UP (ref 63–140)

## 2024-01-05 PROCEDURE — 99232 SBSQ HOSP IP/OBS MODERATE 35: CPT

## 2024-01-05 RX ORDER — PANTOPRAZOLE SODIUM 20 MG/1
40 TABLET, DELAYED RELEASE ORAL
Refills: 0 | Status: DISCONTINUED | OUTPATIENT
Start: 2024-01-05 | End: 2024-01-08

## 2024-01-05 RX ADMIN — ENOXAPARIN SODIUM 40 MILLIGRAM(S): 100 INJECTION SUBCUTANEOUS at 03:20

## 2024-01-05 RX ADMIN — Medication 40 MILLIGRAM(S): at 22:00

## 2024-01-05 RX ADMIN — Medication 50 MICROGRAM(S): at 06:43

## 2024-01-05 RX ADMIN — Medication 1 DROP(S): at 21:57

## 2024-01-05 RX ADMIN — Medication 81 MILLIGRAM(S): at 09:03

## 2024-01-05 RX ADMIN — ATORVASTATIN CALCIUM 40 MILLIGRAM(S): 80 TABLET, FILM COATED ORAL at 21:57

## 2024-01-05 RX ADMIN — Medication 40 MILLIGRAM(S): at 09:03

## 2024-01-05 RX ADMIN — CEFTRIAXONE 2000 MILLIGRAM(S): 500 INJECTION, POWDER, FOR SOLUTION INTRAMUSCULAR; INTRAVENOUS at 09:04

## 2024-01-05 RX ADMIN — PANTOPRAZOLE SODIUM 40 MILLIGRAM(S): 20 TABLET, DELAYED RELEASE ORAL at 06:43

## 2024-01-05 RX ADMIN — LISINOPRIL 20 MILLIGRAM(S): 2.5 TABLET ORAL at 09:03

## 2024-01-05 NOTE — PROGRESS NOTE ADULT - ASSESSMENT
57 y/o Male with h/o DM-1 on insulin pump, Hypothyroidism, HTN and HPL was admitted on 1/24 for b/l weakness to right face, inability to close right eyes, intermittent double and blurry vision of right eye x one day. He also has some slurred and garbled speech per his wife. He also feels numbness to right side of his tongue. His gait was also off balance. He also has some upper back pain for last 7-10 days, and he was seen here in ED on Dec 26, and he was given Methocarbamol and Diclofenac which he was taking with any relief in his upper back pain. He was seen in an office as outpatient by an Orthopedic Spine surgeon Dr. Jorden Blas, and he was referred to ED due to blurry vision and diplopia. On 1/3 he ws noted with Lyme AB detected and given doxycycline, then ceftriaxone.     1. Lyme disease. B/l facial palsy. Acute meningitis. 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  -face feels less stiff  -CSF analysis reviewed  -probable tick exposure several weeks/ months ago  -on ceftriaxone 2 gm IV qd # 2  -tolerating abx well so far; no side effects noted  -neurology evaluation appreciated  -steroids per neuro  -f/u Lyme CSF serology  -f/u cultures  -continue abx coverage  -may need longer term IV abx coverage   -plan for midline  -monitor temps  -f/u CBC  -supportive care  2. Other issues:   -care per medicine    d/w wife and daughter at bedside  All questions answered

## 2024-01-05 NOTE — PROGRESS NOTE ADULT - SUBJECTIVE AND OBJECTIVE BOX
The patient is a 58y Male with significant PMH of DM-1 on insulin pump, Hypothyroidism, HTN and HPL presented in ED with c/o weakness to right face, inability to close right eyes, intermittent double and blurry vision of right eye since today morning.  Patient's wife Ms. Burrell present at bedside in ED. He also has some slurred and garbled speech per his wife. He also feels numbness to right side of his tongue. His gait was also off balance. He also has some upper back pain for last 7-10 days, and he was seen here in ED on Dec 26, and he was given Methocarbamol and Diclofenac which he was taking with any relief in his upper back pain. He denies any fall or injury to his spine.  Today he was seen in an office as outpatient by an Orthopedic Spine surgeon Dr. Jorden Blas, and he was referred to ED due to blurry vision and diplopia. Upon arrival in ED, his NIH SS was 2. No code stroke in ED. His CBC and CMP unremarkable except BS of 242. Mg, Troponin and Phosphorus level wnl. UA negative.  CT angio Head and Neck unremarkable.  CT angio Chest, abdomen and Pelvis negative for any acute process negative aortic syndrome.    Denies any HA, CP, SOB. No abdominal pain    REVIEW OF SYSTEMS:  General: NAD, hemodynamically stable   HEENT:  Eyes:  No visual loss, blurred vision, double vision or yellow sclerae. Ears, Nose, Throat:  No hearing loss, sneezing, congestion, runny nose or sore throat.  SKIN:  No rash or itching.  CARDIOVASCULAR:  No chest pain, chest pressure or chest discomfort. No palpitations or edema.  RESPIRATORY:  No shortness of breath, cough or sputum.  GASTROINTESTINAL:  No anorexia, nausea, vomiting or diarrhea. No abdominal pain or blood.  NEUROLOGICAL:  No headache, dizziness, syncope, paralysis, ataxia, numbness or tingling in the extremities. No change in bowel or bladder control.  MUSCULOSKELETAL:  No muscle, back pain, joint pain or stiffness.  HEMATOLOGIC:  No anemia, bleeding or bruising.  LYMPHATICS:  No enlarged nodes. No history of splenectomy.  ENDOCRINOLOGIC:  No reports of sweating, cold or heat intolerance. No polyuria or polydipsia.  ALLERGIES:  No history of asthma, hives, eczema or rhinitis.    Physical Exam:   GENERAL APPEARANCE:  NAD, hemodynamically stable  T(C): 36.4 (01-03-24 @ 09:13), Max: 36.8 (01-02-24 @ 19:00)  HR: 58 (01-03-24 @ 09:13) (58 - 93)  BP: 138/60 (01-03-24 @ 09:13) (138/60 - 167/81)  RR: 18 (01-03-24 @ 09:13) (18 - 18)  SpO2: 98% (01-03-24 @ 09:13) (97% - 100%)  HEENT:  Head is normocephalic    Skin:  Warm and dry without any rash   NECK:  Supple without lymphadenopathy.   HEART:  Regular rate and rhythm. normal S1 and S2, No M/R/G  LUNGS:  Good ins/exp effort, no W/R/R/C  ABDOMEN:  Soft, nontender, nondistended with good bowel sounds heard  EXTREMITIES:  Without cyanosis, clubbing or edema.   NEUROLOGICAL:  Gross nonfocal     Labs:   CBC Full  -  ( 02 Jan 2024 14:38 )  WBC Count : 4.97 K/uL  RBC Count : 4.90 M/uL  Hemoglobin : 15.2 g/dL  Hematocrit : 43.7 %  Platelet Count - Automated : 280 K/uL    Urinalysis Basic - ( 03 Jan 2024 07:50 )    Color: x / Appearance: x / SG: x / pH: x  Gluc: 156 mg/dL / Ketone: x  / Bili: x / Urobili: x   Blood: x / Protein: x / Nitrite: x   Leuk Esterase: x / RBC: x / WBC x   Sq Epi: x / Non Sq Epi: x / Bacteria: x      01-03    136  |  104  |  13  ----------------------------<  156<H>  4.3   |  30  |  0.65    Ca    8.9      03 Jan 2024 07:50  Phos  3.2     01-02  Mg     2.1     01-02    TPro  7.8  /  Alb  3.6  /  TBili  0.3  /  DBili  x   /  AST  28  /  ALT  35  /  AlkPhos  72  01-02    # Lyme meningitis  # B/l facial palsy  # Acute meningitis   - CVA has been ruled our  - ESR -  normal   - Abnormal LP with high WBC  - Neurology following    # DMI  - continue with DM pump    # DVT proph     The patient is a 58y Male with significant PMH of DM-1 on insulin pump, Hypothyroidism, HTN and HPL presented in ED with c/o weakness to right face, inability to close right eyes, intermittent double and blurry vision of right eye since today morning.  Patient's wife Ms. Burrell present at bedside in ED. He also has some slurred and garbled speech per his wife. He also feels numbness to right side of his tongue. His gait was also off balance. He also has some upper back pain for last 7-10 days, and he was seen here in ED on Dec 26, and he was given Methocarbamol and Diclofenac which he was taking with any relief in his upper back pain. He denies any fall or injury to his spine.  Today he was seen in an office as outpatient by an Orthopedic Spine surgeon Dr. Jorden Blas, and he was referred to ED due to blurry vision and diplopia. Upon arrival in ED, his NIH SS was 2. No code stroke in ED. His CBC and CMP unremarkable except BS of 242. Mg, Troponin and Phosphorus level wnl. UA negative.  CT angio Head and Neck unremarkable.  CT angio Chest, abdomen and Pelvis negative for any acute process negative aortic syndrome.    Denies any HA, CP, SOB. No abdominal pain    REVIEW OF SYSTEMS:  General: NAD, hemodynamically stable   HEENT:  Eyes:  No visual loss, blurred vision, double vision or yellow sclerae. Ears, Nose, Throat:  No hearing loss, sneezing, congestion, runny nose or sore throat.  SKIN:  No rash or itching.  CARDIOVASCULAR:  No chest pain, chest pressure or chest discomfort. No palpitations or edema.  RESPIRATORY:  No shortness of breath, cough or sputum.  GASTROINTESTINAL:  No anorexia, nausea, vomiting or diarrhea. No abdominal pain or blood.  NEUROLOGICAL:  No headache, dizziness, syncope, paralysis, ataxia, numbness or tingling in the extremities. No change in bowel or bladder control.  MUSCULOSKELETAL:  No muscle, back pain, joint pain or stiffness.  HEMATOLOGIC:  No anemia, bleeding or bruising.  LYMPHATICS:  No enlarged nodes. No history of splenectomy.  ENDOCRINOLOGIC:  No reports of sweating, cold or heat intolerance. No polyuria or polydipsia.  ALLERGIES:  No history of asthma, hives, eczema or rhinitis.    Physical Exam:   GENERAL APPEARANCE:  NAD, hemodynamically stable  T(C): 36.4 (01-03-24 @ 09:13), Max: 36.8 (01-02-24 @ 19:00)  HR: 58 (01-03-24 @ 09:13) (58 - 93)  BP: 138/60 (01-03-24 @ 09:13) (138/60 - 167/81)  RR: 18 (01-03-24 @ 09:13) (18 - 18)  SpO2: 98% (01-03-24 @ 09:13) (97% - 100%)  HEENT:  Head is normocephalic    Skin:  Warm and dry without any rash   NECK:  Supple without lymphadenopathy.   HEART:  Regular rate and rhythm. normal S1 and S2, No M/R/G  LUNGS:  Good ins/exp effort, no W/R/R/C  ABDOMEN:  Soft, nontender, nondistended with good bowel sounds heard  EXTREMITIES:  Without cyanosis, clubbing or edema.   NEUROLOGICAL:  Gross nonfocal     Labs:   CBC Full  -  ( 02 Jan 2024 14:38 )  WBC Count : 4.97 K/uL  RBC Count : 4.90 M/uL  Hemoglobin : 15.2 g/dL  Hematocrit : 43.7 %  Platelet Count - Automated : 280 K/uL    Urinalysis Basic - ( 03 Jan 2024 07:50 )    Color: x / Appearance: x / SG: x / pH: x  Gluc: 156 mg/dL / Ketone: x  / Bili: x / Urobili: x   Blood: x / Protein: x / Nitrite: x   Leuk Esterase: x / RBC: x / WBC x   Sq Epi: x / Non Sq Epi: x / Bacteria: x      01-03    136  |  104  |  13  ----------------------------<  156<H>  4.3   |  30  |  0.65    Ca    8.9      03 Jan 2024 07:50  Phos  3.2     01-02  Mg     2.1     01-02    TPro  7.8  /  Alb  3.6  /  TBili  0.3  /  DBili  x   /  AST  28  /  ALT  35  /  AlkPhos  72  01-02    MRI:     Enhancement of the distal canalicular segments and faint enhancement of   the mastoid and extraocular temporal segments of the bilateral facial   nerves. Note that in some instances, enhancement of the facial nerves can   be within normal limits. However, given patient's symptoms, an   inflammatory process is suspected.    # Lyme meningitis  # B/l facial palsy  # Acute meningitis   - CVA has been ruled our  - ESR -  normal   - IV solumedrol started  - Abnormal LP with high WBC  - Neurology following    # DMI  - continue with DM pump    # DVT proph     The patient is a 58y Male with significant PMH of DM-1 on insulin pump, Hypothyroidism, HTN and HPL presented in ED with c/o weakness to right face, inability to close right eyes, intermittent double and blurry vision of right eye since today morning.  Patient's wife Ms. Burrell present at bedside in ED. He also has some slurred and garbled speech per his wife. He also feels numbness to right side of his tongue. His gait was also off balance. He also has some upper back pain for last 7-10 days, and he was seen here in ED on Dec 26, and he was given Methocarbamol and Diclofenac which he was taking with any relief in his upper back pain. He denies any fall or injury to his spine.  Today he was seen in an office as outpatient by an Orthopedic Spine surgeon Dr. Jorden Blas, and he was referred to ED due to blurry vision and diplopia. Upon arrival in ED, his NIH SS was 2. No code stroke in ED. His CBC and CMP unremarkable except BS of 242. Mg, Troponin and Phosphorus level wnl. UA negative.  CT angio Head and Neck unremarkable.  CT angio Chest, abdomen and Pelvis negative for any acute process negative aortic syndrome.    No new neurological symptoms.     REVIEW OF SYSTEMS:  General: NAD, hemodynamically stable   HEENT:  Eyes:  No visual loss, blurred vision, double vision or yellow sclerae. Ears, Nose, Throat:  No hearing loss, sneezing, congestion, runny nose or sore throat.  SKIN:  No rash or itching.  CARDIOVASCULAR:  No chest pain, chest pressure or chest discomfort. No palpitations or edema.  RESPIRATORY:  No shortness of breath, cough or sputum.  GASTROINTESTINAL:  No anorexia, nausea, vomiting or diarrhea. No abdominal pain or blood.  NEUROLOGICAL:  No headache, dizziness, syncope, paralysis, ataxia, numbness or tingling in the extremities. No change in bowel or bladder control.  MUSCULOSKELETAL:  No muscle, back pain, joint pain or stiffness.  HEMATOLOGIC:  No anemia, bleeding or bruising.  LYMPHATICS:  No enlarged nodes. No history of splenectomy.  ENDOCRINOLOGIC:  No reports of sweating, cold or heat intolerance. No polyuria or polydipsia.  ALLERGIES:  No history of asthma, hives, eczema or rhinitis.    Physical Exam:   GENERAL APPEARANCE:  NAD, hemodynamically stable  T(C): 36.4 (01-03-24 @ 09:13), Max: 36.8 (01-02-24 @ 19:00)  HR: 58 (01-03-24 @ 09:13) (58 - 93)  BP: 138/60 (01-03-24 @ 09:13) (138/60 - 167/81)  RR: 18 (01-03-24 @ 09:13) (18 - 18)  SpO2: 98% (01-03-24 @ 09:13) (97% - 100%)  HEENT:  Head is normocephalic    Skin:  Warm and dry without any rash   NECK:  Supple without lymphadenopathy.   HEART:  Regular rate and rhythm. normal S1 and S2, No M/R/G  LUNGS:  Good ins/exp effort, no W/R/R/C  ABDOMEN:  Soft, nontender, nondistended with good bowel sounds heard  EXTREMITIES:  Without cyanosis, clubbing or edema.   NEUROLOGICAL:  CN deficits / unable to smile / trouble talking.   Labs:   CBC Full  -  ( 02 Jan 2024 14:38 )  WBC Count : 4.97 K/uL  RBC Count : 4.90 M/uL  Hemoglobin : 15.2 g/dL  Hematocrit : 43.7 %  Platelet Count - Automated : 280 K/uL    Urinalysis Basic - ( 03 Jan 2024 07:50 )    Color: x / Appearance: x / SG: x / pH: x  Gluc: 156 mg/dL / Ketone: x  / Bili: x / Urobili: x   Blood: x / Protein: x / Nitrite: x   Leuk Esterase: x / RBC: x / WBC x   Sq Epi: x / Non Sq Epi: x / Bacteria: x      01-03    136  |  104  |  13  ----------------------------<  156<H>  4.3   |  30  |  0.65    Ca    8.9      03 Jan 2024 07:50  Phos  3.2     01-02  Mg     2.1     01-02    TPro  7.8  /  Alb  3.6  /  TBili  0.3  /  DBili  x   /  AST  28  /  ALT  35  /  AlkPhos  72  01-02    MRI:     Enhancement of the distal canalicular segments and faint enhancement of   the mastoid and extraocular temporal segments of the bilateral facial   nerves. Note that in some instances, enhancement of the facial nerves can   be within normal limits. However, given patient's symptoms, an   inflammatory process is suspected.    # Lyme meningitis  # Lyme disease  # B/l facial palsy  # Acute meningitis   - CVA has been ruled our  - on ceftriaxone 2 gm IV qd # 2  - ESR -  normal   - IV solumedrol started  - Abnormal LP with high WBC  - may need longer term IV abx coverage   - plan for midline  - Neurology following    # DMI  - continue with DM pump    # DVT proph

## 2024-01-05 NOTE — PROGRESS NOTE ADULT - ASSESSMENT
Mr. Miranda is a 58y Male with significant PMH of DM-1 on insulin pump, Hypothyroidism, HTN and HPL presented in ED with right sided facial weakness, intermittent double and blurry vision of the right eye, val-oraland right sided tongue numbness which started on 1/1. He came to the ED on 1/2 after being sent by orthopedics when he went for an outpatient visit.   Facial weakness has progressed to the right side as well.    Multiple cranial neuropathies:  -MRI brain is negative for stroke. Enhancement of b/l facial nerves are seen.  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 Regan will cover beginning 1/6 and can f/u as needed.  Discussed with patient, his daughter, wife and Dr. Diaz

## 2024-01-05 NOTE — PROGRESS NOTE ADULT - SUBJECTIVE AND OBJECTIVE BOX
Date of service: 01-05-24 @ 11:30    OOB to chair  Has b/l facial palsy  No fever    ROS: no fever or chills; denies dizziness, no HA, no SOB or cough, no abdominal pain, no diarrhea or constipation; no dysuria, no legs pain, no rashes    MEDICATIONS  (STANDING):  aspirin enteric coated 81 milliGRAM(s) Oral daily  atorvastatin 40 milliGRAM(s) Oral at bedtime  cefTRIAXone Injectable. 2000 milliGRAM(s) IV Push <User Schedule>  dextrose 5%. 1000 milliLiter(s) (100 mL/Hr) IV Continuous <Continuous>  dextrose 5%. 1000 milliLiter(s) (50 mL/Hr) IV Continuous <Continuous>  dextrose 50% Injectable 25 Gram(s) IV Push once  dextrose 50% Injectable 25 Gram(s) IV Push once  dextrose 50% Injectable 12.5 Gram(s) IV Push once  enoxaparin Injectable 40 milliGRAM(s) SubCutaneous every 24 hours  glucagon  Injectable 1 milliGRAM(s) IntraMuscular once  insulin lispro (HumaLOG) Pump 1 Each SubCutaneous Continuous Pump  levothyroxine 50 MICROGram(s) Oral daily  lisinopril 20 milliGRAM(s) Oral daily  methylPREDNISolone sodium succinate Injectable 40 milliGRAM(s) IV Push every 12 hours  pantoprazole    Tablet 40 milliGRAM(s) Oral before breakfast  sodium chloride 0.9%. 1000 milliLiter(s) (125 mL/Hr) IV Continuous <Continuous>  sodium chloride 0.9%. 1000 milliLiter(s) (75 mL/Hr) IV Continuous <Continuous>    Vital Signs Last 24 Hrs  T(C): 36.3 (05 Jan 2024 08:25), Max: 37 (04 Jan 2024 23:20)  T(F): 97.3 (05 Jan 2024 08:25), Max: 98.6 (04 Jan 2024 23:20)  HR: 76 (05 Jan 2024 08:25) (76 - 85)  BP: 130/75 (05 Jan 2024 08:25) (118/53 - 137/70)  BP(mean): --  RR: 18 (05 Jan 2024 08:25) (18 - 18)  SpO2: 97% (05 Jan 2024 08:25) (96% - 97%)    Parameters below as of 05 Jan 2024 08:25  Patient On (Oxygen Delivery Method): room air     Physical exam:    Constitutional:  No acute distress  HEENT: NC/AT, EOMI, PERRLA, conjunctivae clear; ears and nose atraumatic  b/l facial palsy  Neck: supple; thyroid not palpable  Back: no tenderness  Respiratory: respiratory effort normal; clear to auscultation  Cardiovascular: S1S2 regular, no murmurs  Abdomen: soft, not tender, not distended, positive BS  Genitourinary: no suprapubic tenderness  Lymphatic: no LN palpable  Musculoskeletal: no muscle tenderness, no joint swelling or tenderness  Extremities: no pedal edema  Neurological/ Psychiatric: AxOx3, moving all extremities  Skin: no rashes; no palpable lesions    Labs: reviewed                        14.5   5.61  )-----------( 283      ( 04 Jan 2024 07:54 )             42.6     01-04    138  |  104  |  12  ----------------------------<  156<H>  4.0   |  30  |  0.68    Ca    9.0      04 Jan 2024 07:54    TPro  x   /  Alb  3676  /  TBili  x   /  DBili  x   /  AST  x   /  ALT  x   /  AlkPhos  x   01-03  C-Reactive Protein, Serum: <3 mg/L (01-03-24 @ 07:50)    TPro  7.8  /  Alb  3.6  /  TBili  0.3  /  DBili  x   /  AST  28  /  ALT  35  /  AlkPhos  72  01-02     LIVER FUNCTIONS - ( 02 Jan 2024 14:38 )  Alb: 3.6 g/dL / Pro: 7.8 gm/dL / ALK PHOS: 72 U/L / ALT: 35 U/L / AST: 28 U/L / GGT: x           Lyme IG G and IgM detected   Babesia microti PCR, Bld (collected 03 Jan 2024 21:39)    Babesia microti PCR, Bld (collected 03 Jan 2024 21:39)    Culture - CSF with Gram Stain (collected 03 Jan 2024 19:00)  Source: .CSF  Gram Stain (03 Jan 2024 23:31):    polymorphonuclear leukocytes seen    No organisms seen    by cytocentrifuge  Preliminary Report (04 Jan 2024 15:53):    No growth    Culture - Urine (collected 02 Jan 2024 16:15)  Source: Clean Catch Clean Catch (Midstream)  Final Report (03 Jan 2024 14:56):    No growth    Radiology: all available radiological tests reviewed    Advanced directives addressed: full resuscitation Date of service: 01-05-24 @ 11:30    OOB to chair  Has b/l facial palsy  No fever  Has diplopia  No HA    ROS: no fever or chills; denies dizziness, no SOB or cough, no abdominal pain, no diarrhea or constipation; no dysuria, no legs pain, no rashes    MEDICATIONS  (STANDING):  aspirin enteric coated 81 milliGRAM(s) Oral daily  atorvastatin 40 milliGRAM(s) Oral at bedtime  cefTRIAXone Injectable. 2000 milliGRAM(s) IV Push <User Schedule>  dextrose 5%. 1000 milliLiter(s) (100 mL/Hr) IV Continuous <Continuous>  dextrose 5%. 1000 milliLiter(s) (50 mL/Hr) IV Continuous <Continuous>  dextrose 50% Injectable 25 Gram(s) IV Push once  dextrose 50% Injectable 25 Gram(s) IV Push once  dextrose 50% Injectable 12.5 Gram(s) IV Push once  enoxaparin Injectable 40 milliGRAM(s) SubCutaneous every 24 hours  glucagon  Injectable 1 milliGRAM(s) IntraMuscular once  insulin lispro (HumaLOG) Pump 1 Each SubCutaneous Continuous Pump  levothyroxine 50 MICROGram(s) Oral daily  lisinopril 20 milliGRAM(s) Oral daily  methylPREDNISolone sodium succinate Injectable 40 milliGRAM(s) IV Push every 12 hours  pantoprazole    Tablet 40 milliGRAM(s) Oral before breakfast  sodium chloride 0.9%. 1000 milliLiter(s) (125 mL/Hr) IV Continuous <Continuous>  sodium chloride 0.9%. 1000 milliLiter(s) (75 mL/Hr) IV Continuous <Continuous>    Vital Signs Last 24 Hrs  T(C): 36.3 (05 Jan 2024 08:25), Max: 37 (04 Jan 2024 23:20)  T(F): 97.3 (05 Jan 2024 08:25), Max: 98.6 (04 Jan 2024 23:20)  HR: 76 (05 Jan 2024 08:25) (76 - 85)  BP: 130/75 (05 Jan 2024 08:25) (118/53 - 137/70)  BP(mean): --  RR: 18 (05 Jan 2024 08:25) (18 - 18)  SpO2: 97% (05 Jan 2024 08:25) (96% - 97%)    Parameters below as of 05 Jan 2024 08:25  Patient On (Oxygen Delivery Method): room air     Physical exam:    Constitutional:  No acute distress  HEENT: NC/AT, EOMI, PERRLA, conjunctivae clear; ears and nose atraumatic  b/l facial palsy  Neck: supple; thyroid not palpable  Back: no tenderness  Respiratory: respiratory effort normal; clear to auscultation  Cardiovascular: S1S2 regular, no murmurs  Abdomen: soft, not tender, not distended, positive BS  Genitourinary: no suprapubic tenderness  Lymphatic: no LN palpable  Musculoskeletal: no muscle tenderness, no joint swelling or tenderness  Extremities: no pedal edema  Neurological/ Psychiatric: AxOx3, moving all extremities  Skin: no rashes; no palpable lesions    Labs: reviewed                        14.5   5.61  )-----------( 283      ( 04 Jan 2024 07:54 )             42.6     01-04    138  |  104  |  12  ----------------------------<  156<H>  4.0   |  30  |  0.68    Ca    9.0      04 Jan 2024 07:54    TPro  x   /  Alb  3676  /  TBili  x   /  DBili  x   /  AST  x   /  ALT  x   /  AlkPhos  x   01-03  C-Reactive Protein, Serum: <3 mg/L (01-03-24 @ 07:50)    TPro  7.8  /  Alb  3.6  /  TBili  0.3  /  DBili  x   /  AST  28  /  ALT  35  /  AlkPhos  72  01-02     LIVER FUNCTIONS - ( 02 Jan 2024 14:38 )  Alb: 3.6 g/dL / Pro: 7.8 gm/dL / ALK PHOS: 72 U/L / ALT: 35 U/L / AST: 28 U/L / GGT: x           Lyme IG G and IgM detected   Babesia microti PCR, Bld (collected 03 Jan 2024 21:39)    Babesia microti PCR, Bld (collected 03 Jan 2024 21:39)    Culture - CSF with Gram Stain (collected 03 Jan 2024 19:00)  Source: .CSF  Gram Stain (03 Jan 2024 23:31):    polymorphonuclear leukocytes seen    No organisms seen    by cytocentrifuge  Preliminary Report (04 Jan 2024 15:53):    No growth    Culture - Urine (collected 02 Jan 2024 16:15)  Source: Clean Catch Clean Catch (Midstream)  Final Report (03 Jan 2024 14:56):    No growth    Radiology: all available radiological tests reviewed    Advanced directives addressed: full resuscitation

## 2024-01-05 NOTE — PROGRESS NOTE ADULT - SUBJECTIVE AND OBJECTIVE BOX
Interval History:  1/5/24: He ha no new complaints. He thinks he may be able to move his right cheek slighty more    MEDICATIONS  (STANDING):  aspirin enteric coated 81 milliGRAM(s) Oral daily  atorvastatin 40 milliGRAM(s) Oral at bedtime  cefTRIAXone Injectable. 2000 milliGRAM(s) IV Push <User Schedule>  dextrose 5%. 1000 milliLiter(s) (50 mL/Hr) IV Continuous <Continuous>  dextrose 5%. 1000 milliLiter(s) (100 mL/Hr) IV Continuous <Continuous>  dextrose 50% Injectable 25 Gram(s) IV Push once  dextrose 50% Injectable 12.5 Gram(s) IV Push once  dextrose 50% Injectable 25 Gram(s) IV Push once  enoxaparin Injectable 40 milliGRAM(s) SubCutaneous every 24 hours  glucagon  Injectable 1 milliGRAM(s) IntraMuscular once  insulin lispro (HumaLOG) Pump 1 Each SubCutaneous Continuous Pump  levothyroxine 50 MICROGram(s) Oral daily  lisinopril 20 milliGRAM(s) Oral daily  methylPREDNISolone sodium succinate Injectable 40 milliGRAM(s) IV Push every 12 hours  pantoprazole    Tablet 40 milliGRAM(s) Oral before breakfast  sodium chloride 0.9%. 1000 milliLiter(s) (125 mL/Hr) IV Continuous <Continuous>  sodium chloride 0.9%. 1000 milliLiter(s) (75 mL/Hr) IV Continuous <Continuous>    MEDICATIONS  (PRN):  acetaminophen     Tablet .. 650 milliGRAM(s) Oral every 6 hours PRN Mild Pain (1 - 3)  dextrose Oral Gel 15 Gram(s) Oral once PRN Blood Glucose LESS THAN 70 milliGRAM(s)/deciliter  ondansetron Injectable 4 milliGRAM(s) IV Push every 8 hours PRN Nausea and/or Vomiting      Allergies    No Known Allergies    Intolerances        PHYSICAL EXAM:  Vital Signs Last 24 Hrs  T(F): 97.3 (01-05-24 @ 08:25)  HR: 76 (01-05-24 @ 08:25)  BP: 130/75 (01-05-24 @ 08:25)  RR: 18 (01-05-24 @ 08:25)      GENERAL: NAD, well-groomed  HEAD:  Atraumatic, Normocephalic  Neuro:  Awake, alert, no aphasia  CN: PERRL, EOMI, b/l facial weakness both upper and lower - cannot fully close eyes, smile, puff out cheeks. Can protrude tongue. Palate rises symmetrically.   motor: normal tone, no pronator drift, full strength in all four extremities  sensory: intact to light touch  coordination: finger to nose intact bilaterally  DTRs: symmetric, biceps, radialis trace to 1, patellars 1+ b/l          LABS:                        14.5   5.61  )-----------( 283      ( 04 Jan 2024 07:54 )             42.6     01-04    138  |  104  |  12  ----------------------------<  156<H>  4.0   |  30  |  0.68    Ca    9.0      04 Jan 2024 07:54      PT/INR - ( 03 Jan 2024 17:17 )   PT: 11.3 sec;   INR: 1.00 ratio           Urinalysis Basic - ( 04 Jan 2024 07:54 )    Color: x / Appearance: x / SG: x / pH: x  Gluc: 156 mg/dL / Ketone: x  / Bili: x / Urobili: x   Blood: x / Protein: x / Nitrite: x   Leuk Esterase: x / RBC: x / WBC x   Sq Epi: x / Non Sq Epi: x / Bacteria: x        RADIOLOGY & ADDITIONAL STUDIES:      CTA head and neck 1/2/24:  CTA BRAIN: Atheromatous irregularity along the cavernous ICAs   bilaterally. Otherwise, no flow-limiting stenosis or occlusion.    CTANECK: Patent cervical vasculature. No flow limiting stenosis or   occlusion.    MRI head non-contrast 1/2/24:  No acute intracranial hemorrhage or evidence of acute   ischemia.    MR cervical spine 1/3/24:   1. Straightening of lordosis can be seen in the setting of muscle spasm.  2. C3-C4 and C4-C5 disc bulges, resulting in mild left neural foramen   stenosis with uncovertebral spurring.  3. C5-C6 right paracentral disc protrusion superimposed upon a disc   bulge, impinging upon the thecal sac, resulting in mild central canal and   mild left neural foramen stenosis with uncovertebral spurring.  4. No cord compression or gross altered cord signal.  5. Additional findings described in detail above.    MRI brain with contrast 1/3/24:  Study is a foreshortened examination.  Enhancement of the distal canalicular segments and faint enhancement of   the mastoid and extraocular temporal segments of the bilateral facial   nerves. Note that in some instances, enhancement of the facial nerves can   be within normal limits. However, given patient's symptoms, an   inflammatory process is suspected.      Lyme:  Lyme IgM Ab: 0.935 Index (equivocal)  Lyme IgG > 45 (positive)  Lyme IgG/IgM Abs 8.93  Lyme C6 interpretation (positive)    CSF 1/3/24:  nucleated cells 255, 75% lymphocytes  protein 119  Glucose 107  PCR not detected

## 2024-01-06 LAB
GLUCOSE BLDC GLUCOMTR-MCNC: 161 MG/DL — HIGH (ref 70–99)
GLUCOSE BLDC GLUCOMTR-MCNC: 161 MG/DL — HIGH (ref 70–99)
GLUCOSE BLDC GLUCOMTR-MCNC: 194 MG/DL — HIGH (ref 70–99)
GLUCOSE BLDC GLUCOMTR-MCNC: 194 MG/DL — HIGH (ref 70–99)
GLUCOSE BLDC GLUCOMTR-MCNC: 207 MG/DL — HIGH (ref 70–99)
GLUCOSE BLDC GLUCOMTR-MCNC: 207 MG/DL — HIGH (ref 70–99)

## 2024-01-06 PROCEDURE — 99232 SBSQ HOSP IP/OBS MODERATE 35: CPT

## 2024-01-06 PROCEDURE — 99233 SBSQ HOSP IP/OBS HIGH 50: CPT

## 2024-01-06 RX ADMIN — Medication 40 MILLIGRAM(S): at 10:17

## 2024-01-06 RX ADMIN — ENOXAPARIN SODIUM 40 MILLIGRAM(S): 100 INJECTION SUBCUTANEOUS at 03:45

## 2024-01-06 RX ADMIN — LISINOPRIL 20 MILLIGRAM(S): 2.5 TABLET ORAL at 10:16

## 2024-01-06 RX ADMIN — CEFTRIAXONE 2000 MILLIGRAM(S): 500 INJECTION, POWDER, FOR SOLUTION INTRAMUSCULAR; INTRAVENOUS at 10:17

## 2024-01-06 RX ADMIN — ATORVASTATIN CALCIUM 40 MILLIGRAM(S): 80 TABLET, FILM COATED ORAL at 22:14

## 2024-01-06 RX ADMIN — Medication 50 MICROGRAM(S): at 06:47

## 2024-01-06 RX ADMIN — Medication 81 MILLIGRAM(S): at 10:16

## 2024-01-06 RX ADMIN — Medication 40 MILLIGRAM(S): at 22:14

## 2024-01-06 RX ADMIN — PANTOPRAZOLE SODIUM 40 MILLIGRAM(S): 20 TABLET, DELAYED RELEASE ORAL at 06:47

## 2024-01-06 NOTE — PROGRESS NOTE ADULT - ASSESSMENT
58y Male with significant PMH of DM-1 on insulin pump, Hypothyroidism, HTN and HPL presented in ED with right sided facial weakness, intermittent double and blurry vision of the right eye, val-oral and right sided tongue numbness which started on 1/1, came to the ED on 1/2 after being sent by orthopedics when he went for an outpatient visit. Facial weakness progressed to the right side as well.    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 Jaramillo 58y Male with significant PMH of DM-1 on insulin pump, Hypothyroidism, HTN and HPL presented in ED with right sided facial weakness, intermittent double and blurry vision of the right eye, val-oral and right sided tongue numbness which started on 1/1, came to the ED on 1/2 after being sent by orthopedics when he went for an outpatient visit. Facial weakness progressed to the right side as well.    # Multiple cranial neuropathies; bilateral 7th cranial neuropathies; left > right side; possibly bilateral Shepherd', enhancement of b/l facial nerves are seen on MR with contrast, no significant stenosis, vascular malformations on CTA    # Most likely aseptic meningitis - lyme related as serum positive for Lyme - IgG > IgM; CSF protein 119, Cells 225, primarily lymphs 75%; rule out WNV      -Continue IV ceftriaxone  -Continue solumedrol 40 mg q12  -f/u CSF lyme and WNV  -GI prophylaxis - on pantoprazole  - F/U with SLP  - Will send for ACE - if CSF available    Above D/W pt, his daughter and Dr. Jaramillo

## 2024-01-06 NOTE — PROGRESS NOTE ADULT - SUBJECTIVE AND OBJECTIVE BOX
Date of service: 01-06-24 @ 12:50    OOB to chair  Has b/l facial palsy, but started to be able to move some facial muscles  has a small smile   No fever    ROS: no fever or chills; denies dizziness, no HA, no SOB or cough, no abdominal pain, no diarrhea or constipation; no dysuria, no legs pain, no rashes    MEDICATIONS  (STANDING):  aspirin enteric coated 81 milliGRAM(s) Oral daily  atorvastatin 40 milliGRAM(s) Oral at bedtime  cefTRIAXone Injectable. 2000 milliGRAM(s) IV Push <User Schedule>  dextrose 5%. 1000 milliLiter(s) (50 mL/Hr) IV Continuous <Continuous>  dextrose 5%. 1000 milliLiter(s) (100 mL/Hr) IV Continuous <Continuous>  dextrose 50% Injectable 25 Gram(s) IV Push once  dextrose 50% Injectable 12.5 Gram(s) IV Push once  dextrose 50% Injectable 25 Gram(s) IV Push once  enoxaparin Injectable 40 milliGRAM(s) SubCutaneous every 24 hours  glucagon  Injectable 1 milliGRAM(s) IntraMuscular once  insulin lispro (HumaLOG) Pump 1 Each SubCutaneous Continuous Pump  levothyroxine 50 MICROGram(s) Oral daily  lisinopril 20 milliGRAM(s) Oral daily  methylPREDNISolone sodium succinate Injectable 40 milliGRAM(s) IV Push every 12 hours  pantoprazole    Tablet 40 milliGRAM(s) Oral before breakfast    Vital Signs Last 24 Hrs  T(C): 36.6 (06 Jan 2024 09:16), Max: 36.8 (05 Jan 2024 20:35)  T(F): 97.9 (06 Jan 2024 09:16), Max: 98.3 (05 Jan 2024 20:35)  HR: 74 (06 Jan 2024 09:16) (74 - 96)  BP: 132/61 (06 Jan 2024 09:16) (122/64 - 132/61)  BP(mean): --  RR: 18 (06 Jan 2024 09:16) (18 - 18)  SpO2: 99% (06 Jan 2024 09:16) (98% - 99%)    Parameters below as of 06 Jan 2024 09:16  Patient On (Oxygen Delivery Method): room air     Physical exam:    Constitutional:  No acute distress  HEENT: NC/AT, EOMI, PERRLA, conjunctivae clear; ears and nose atraumatic  b/l facial palsy  Neck: supple; thyroid not palpable  Back: no tenderness  Respiratory: respiratory effort normal; clear to auscultation  Cardiovascular: S1S2 regular, no murmurs  Abdomen: soft, not tender, not distended, positive BS  Genitourinary: no suprapubic tenderness  Lymphatic: no LN palpable  Musculoskeletal: no muscle tenderness, no joint swelling or tenderness  Extremities: no pedal edema  Neurological/ Psychiatric: AxOx3, moving all extremities  Skin: no rashes; no palpable lesions    Labs: reviewed    C-Reactive Protein, Serum: <3 mg/L (01-03-24 @ 07:50)                        14.5   5.61  )-----------( 283      ( 04 Jan 2024 07:54 )             42.6     01-04    138  |  104  |  12  ----------------------------<  156<H>  4.0   |  30  |  0.68    Ca    9.0      04 Jan 2024 07:54    TPro  x   /  Alb  3676  /  TBili  x   /  DBili  x   /  AST  x   /  ALT  x   /  AlkPhos  x   01-03  C-Reactive Protein, Serum: <3 mg/L (01-03-24 @ 07:50)    TPro  7.8  /  Alb  3.6  /  TBili  0.3  /  DBili  x   /  AST  28  /  ALT  35  /  AlkPhos  72  01-02     LIVER FUNCTIONS - ( 02 Jan 2024 14:38 )  Alb: 3.6 g/dL / Pro: 7.8 gm/dL / ALK PHOS: 72 U/L / ALT: 35 U/L / AST: 28 U/L / GGT: x           Lyme IG G and IgM detected   Babesia microti PCR, Bld (collected 03 Jan 2024 21:39)    Babesia microti PCR, Bld (collected 03 Jan 2024 21:39)    Culture - CSF with Gram Stain (collected 03 Jan 2024 19:00)  Source: .CSF  Gram Stain (03 Jan 2024 23:31):    polymorphonuclear leukocytes seen    No organisms seen    by cytocentrifuge  Preliminary Report (04 Jan 2024 15:53):    No growth    Culture - Urine (collected 02 Jan 2024 16:15)  Source: Clean Catch Clean Catch (Midstream)  Final Report (03 Jan 2024 14:56):    No growth    Radiology: all available radiological tests reviewed    Advanced directives addressed: full resuscitation

## 2024-01-06 NOTE — PROGRESS NOTE ADULT - ASSESSMENT
59 y/o Male with h/o DM-1 on insulin pump, Hypothyroidism, HTN and HPL was admitted on 1/24 for b/l weakness to right face, inability to close right eyes, intermittent double and blurry vision of right eye x one day. He also has some slurred and garbled speech per his wife. He also feels numbness to right side of his tongue. His gait was also off balance. He also has some upper back pain for last 7-10 days, and he was seen here in ED on Dec 26, and he was given Methocarbamol and Diclofenac which he was taking with any relief in his upper back pain. He was seen in an office as outpatient by an Orthopedic Spine surgeon Dr. Jorden Blas, and he was referred to ED due to blurry vision and diplopia. On 1/3 he ws noted with Lyme AB detected and given doxycycline, then ceftriaxone.     1. Lyme disease. B/l facial palsy. Acute meningitis. 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  -face feels less stiff  -CSF analysis reviewed  -probable tick exposure several weeks/ months ago  -on ceftriaxone 2 gm IV qd # 3  -tolerating abx well so far; no side effects noted  -neurology evaluation appreciated  -steroids per neuro  -f/u Lyme CSF serology  -f/u cultures  -continue abx coverage  -may need longer term IV abx coverage   -plan for midline  -monitor temps  -f/u CBC  -supportive care  2. Other issues:   -care per medicine    d/w wife and daughter at bedside  All questions answered

## 2024-01-06 NOTE — PROGRESS NOTE ADULT - SUBJECTIVE AND OBJECTIVE BOX
incomplete note    HOSPITALIST PROGRESS NOTE    HPI: The patient is a 58y Male with significant PMH of DM-1 on insulin pump, Hypothyroidism, HTN and HPL presented in ED with c/o weakness to right face, inability to close right eyes, intermittent double and blurry vision of right eye since today morning.  Patient's wife Ms. Burrell present at bedside in ED. He also has some slurred and garbled speech per his wife. He also feels numbness to right side of his tongue. His gait was also off balance. He also has some upper back pain for last 7-10 days, and he was seen here in ED on Dec 26, and he was given Methocarbamol and Diclofenac which he was taking with any relief in his upper back pain. He denies any fall or injury to his spine.  Today he was seen in an office as outpatient by an Orthopedic Spine surgeon Dr. Jorden Blas, and he was referred to ED due to blurry vision and diplopia. Upon arrival in ED, his NIH SS was 2. No code stroke in ED. His CBC and CMP unremarkable except BS of 242. Mg, Troponin and Phosphorus level wnl. UA negative.  CT angio Head and Neck unremarkable.  CT angio Chest, abdomen and Pelvis negative for any acute process negative aortic syndrome.    SUBJECTIVE / INTERVAL HPI: Patient seen and examined at bedside.     VITAL SIGNS:  Vital Signs Last 24 Hrs  T(C): 36.6 (06 Jan 2024 09:16), Max: 36.8 (05 Jan 2024 20:35)  T(F): 97.9 (06 Jan 2024 09:16), Max: 98.3 (05 Jan 2024 20:35)  HR: 74 (06 Jan 2024 09:16) (74 - 96)  BP: 132/61 (06 Jan 2024 09:16) (122/64 - 132/61)  BP(mean): --  RR: 18 (06 Jan 2024 09:16) (18 - 18)  SpO2: 99% (06 Jan 2024 09:16) (98% - 99%)    Parameters below as of 06 Jan 2024 09:16  Patient On (Oxygen Delivery Method): room air      PHYSICAL EXAM:  General: No acute distress  HEENT: NC/AT; PERRL, anicteric sclera; MMM  Neck: Supple  Cardiovascular: +S1/S2, RRR, no murmurs, rubs, gallops  Respiratory: CTA B/L; no W/R/R  Gastrointestinal: soft, NT/ND; +BSx4  Extremities: WWP; no edema, clubbing or cyanosis  Vascular: 2+ radial, DP/PT pulses B/L  Neurological: AOx3.+bilateral facial muscle weakness, unable to smile or close eyes fully. Extremities with 5/5 strength x4      CN deficits / unable to smile / trouble talking    MEDICATIONS:  MEDICATIONS  (STANDING):  aspirin enteric coated 81 milliGRAM(s) Oral daily  atorvastatin 40 milliGRAM(s) Oral at bedtime  cefTRIAXone Injectable. 2000 milliGRAM(s) IV Push <User Schedule>  dextrose 5%. 1000 milliLiter(s) (50 mL/Hr) IV Continuous <Continuous>  dextrose 5%. 1000 milliLiter(s) (100 mL/Hr) IV Continuous <Continuous>  dextrose 50% Injectable 25 Gram(s) IV Push once  dextrose 50% Injectable 12.5 Gram(s) IV Push once  dextrose 50% Injectable 25 Gram(s) IV Push once  enoxaparin Injectable 40 milliGRAM(s) SubCutaneous every 24 hours  glucagon  Injectable 1 milliGRAM(s) IntraMuscular once  insulin lispro (HumaLOG) Pump 1 Each SubCutaneous Continuous Pump  levothyroxine 50 MICROGram(s) Oral daily  lisinopril 20 milliGRAM(s) Oral daily  methylPREDNISolone sodium succinate Injectable 40 milliGRAM(s) IV Push every 12 hours  pantoprazole    Tablet 40 milliGRAM(s) Oral before breakfast  sodium chloride 0.9%. 1000 milliLiter(s) (125 mL/Hr) IV Continuous <Continuous>  sodium chloride 0.9%. 1000 milliLiter(s) (75 mL/Hr) IV Continuous <Continuous>    MEDICATIONS  (PRN):  acetaminophen     Tablet .. 650 milliGRAM(s) Oral every 6 hours PRN Mild Pain (1 - 3)  artificial tears (preservative free) Ophthalmic Solution 1 Drop(s) Both EYES four times a day PRN Dry Eyes  dextrose Oral Gel 15 Gram(s) Oral once PRN Blood Glucose LESS THAN 70 milliGRAM(s)/deciliter  ondansetron Injectable 4 milliGRAM(s) IV Push every 8 hours PRN Nausea and/or Vomiting      ALLERGIES:  Allergies    No Known Allergies    Intolerances        LABS:              CAPILLARY BLOOD GLUCOSE      POCT Blood Glucose.: 161 mg/dL (06 Jan 2024 08:16)        RADIOLOGY & ADDITIONAL TESTS: Reviewed. HOSPITALIST PROGRESS NOTE    HPI: The patient is a 58y Male with significant PMH of DM-1 on insulin pump, Hypothyroidism, HTN and HPL presented in ED with c/o weakness to right face, inability to close right eyes, intermittent double and blurry vision of right eye since today morning.  Patient's wife Ms. Burrell present at bedside in ED. He also has some slurred and garbled speech per his wife. He also feels numbness to right side of his tongue. His gait was also off balance. He also has some upper back pain for last 7-10 days, and he was seen here in ED on Dec 26, and he was given Methocarbamol and Diclofenac which he was taking with any relief in his upper back pain. He denies any fall or injury to his spine.  Today he was seen in an office as outpatient by an Orthopedic Spine surgeon Dr. Jorden Blas, and he was referred to ED due to blurry vision and diplopia. Upon arrival in ED, his NIH SS was 2. No code stroke in ED. His CBC and CMP unremarkable except BS of 242. Mg, Troponin and Phosphorus level wnl. UA negative.  CT angio Head and Neck unremarkable.  CT angio Chest, abdomen and Pelvis negative for any acute process negative aortic syndrome.    SUBJECTIVE / INTERVAL HPI: Patient seen and examined at bedside. Patient feels as if his facial muscles are getting stronger. No complaints at this time.     VITAL SIGNS:  Vital Signs Last 24 Hrs  T(C): 36.6 (06 Jan 2024 09:16), Max: 36.8 (05 Jan 2024 20:35)  T(F): 97.9 (06 Jan 2024 09:16), Max: 98.3 (05 Jan 2024 20:35)  HR: 74 (06 Jan 2024 09:16) (74 - 96)  BP: 132/61 (06 Jan 2024 09:16) (122/64 - 132/61)  BP(mean): --  RR: 18 (06 Jan 2024 09:16) (18 - 18)  SpO2: 99% (06 Jan 2024 09:16) (98% - 99%)    Parameters below as of 06 Jan 2024 09:16  Patient On (Oxygen Delivery Method): room air      PHYSICAL EXAM:  General: No acute distress  HEENT: NC/AT; PERRL, anicteric sclera; MMM  Neck: Supple  Cardiovascular: +S1/S2, RRR, no murmurs, rubs, gallops  Respiratory: CTA B/L; no W/R/R  Gastrointestinal: soft, NT/ND; +BSx4  Extremities: WWP; no edema, clubbing or cyanosis  Vascular: 2+ radial, DP/PT pulses B/L  Neurological: AOx3.+bilateral facial muscle weakness L weaker than R. Extremities with 5/5 strength x4      CN deficits / unable to smile / trouble talking    MEDICATIONS:  MEDICATIONS  (STANDING):  aspirin enteric coated 81 milliGRAM(s) Oral daily  atorvastatin 40 milliGRAM(s) Oral at bedtime  cefTRIAXone Injectable. 2000 milliGRAM(s) IV Push <User Schedule>  dextrose 5%. 1000 milliLiter(s) (50 mL/Hr) IV Continuous <Continuous>  dextrose 5%. 1000 milliLiter(s) (100 mL/Hr) IV Continuous <Continuous>  dextrose 50% Injectable 25 Gram(s) IV Push once  dextrose 50% Injectable 12.5 Gram(s) IV Push once  dextrose 50% Injectable 25 Gram(s) IV Push once  enoxaparin Injectable 40 milliGRAM(s) SubCutaneous every 24 hours  glucagon  Injectable 1 milliGRAM(s) IntraMuscular once  insulin lispro (HumaLOG) Pump 1 Each SubCutaneous Continuous Pump  levothyroxine 50 MICROGram(s) Oral daily  lisinopril 20 milliGRAM(s) Oral daily  methylPREDNISolone sodium succinate Injectable 40 milliGRAM(s) IV Push every 12 hours  pantoprazole    Tablet 40 milliGRAM(s) Oral before breakfast  sodium chloride 0.9%. 1000 milliLiter(s) (125 mL/Hr) IV Continuous <Continuous>  sodium chloride 0.9%. 1000 milliLiter(s) (75 mL/Hr) IV Continuous <Continuous>    MEDICATIONS  (PRN):  acetaminophen     Tablet .. 650 milliGRAM(s) Oral every 6 hours PRN Mild Pain (1 - 3)  artificial tears (preservative free) Ophthalmic Solution 1 Drop(s) Both EYES four times a day PRN Dry Eyes  dextrose Oral Gel 15 Gram(s) Oral once PRN Blood Glucose LESS THAN 70 milliGRAM(s)/deciliter  ondansetron Injectable 4 milliGRAM(s) IV Push every 8 hours PRN Nausea and/or Vomiting      ALLERGIES:  Allergies    No Known Allergies    Intolerances        LABS:              CAPILLARY BLOOD GLUCOSE      POCT Blood Glucose.: 161 mg/dL (06 Jan 2024 08:16)        RADIOLOGY & ADDITIONAL TESTS: Reviewed.

## 2024-01-06 NOTE — PROGRESS NOTE ADULT - SUBJECTIVE AND OBJECTIVE BOX
Patient reports that he is able to move some facial muscles, still has difficulty chewing, also reports slight irritation in the right eye.  He denies headache, double vision, LOC.  Is able to walk independently.    Patient continues to be on ceftriaxone IV, is followed up with ID.    CSF Lyme - pending    ROS: As above, other ROS Negative    MEDICATIONS  (STANDING):  aspirin enteric coated 81 milliGRAM(s) Oral daily  atorvastatin 40 milliGRAM(s) Oral at bedtime  cefTRIAXone Injectable. 2000 milliGRAM(s) IV Push <User Schedule>  dextrose 5%. 1000 milliLiter(s) (50 mL/Hr) IV Continuous <Continuous>  dextrose 5%. 1000 milliLiter(s) (100 mL/Hr) IV Continuous <Continuous>  dextrose 50% Injectable 25 Gram(s) IV Push once  dextrose 50% Injectable 12.5 Gram(s) IV Push once  dextrose 50% Injectable 25 Gram(s) IV Push once  enoxaparin Injectable 40 milliGRAM(s) SubCutaneous every 24 hours  glucagon  Injectable 1 milliGRAM(s) IntraMuscular once  insulin lispro (HumaLOG) Pump 1 Each SubCutaneous Continuous Pump  levothyroxine 50 MICROGram(s) Oral daily  lisinopril 20 milliGRAM(s) Oral daily  methylPREDNISolone sodium succinate Injectable 40 milliGRAM(s) IV Push every 12 hours  pantoprazole    Tablet 40 milliGRAM(s) Oral before breakfast      Vital Signs Last 24 Hrs  T(C): 36.6 (06 Jan 2024 09:16), Max: 36.8 (05 Jan 2024 20:35)  T(F): 97.9 (06 Jan 2024 09:16), Max: 98.3 (05 Jan 2024 20:35)  HR: 74 (06 Jan 2024 09:16) (74 - 96)  BP: 132/61 (06 Jan 2024 09:16) (122/64 - 132/61)  BP(mean): --  RR: 18 (06 Jan 2024 09:16) (18 - 18)  SpO2: 99% (06 Jan 2024 09:16) (98% - 99%)    Parameters below as of 06 Jan 2024 09:16  Patient On (Oxygen Delivery Method): room air      GENERAL: NAD, well-groomed  HEAD:  Atraumatic, Normocephalic  Neuro:  HF: Awake, alert, no aphasia, mild dysarthria  CN: PERRL, EOMI, lagophthalmos - mild right, moderate left side, b/l facial weakness - LMN type, left > right side, Can protrude tongue. Palate rises symmetrically.   Motor: normal tone, no pronator drift, full strength in all four extremities  Sensory: intact to light touch  Coordination: finger to nose intact bilaterally  DTRs: symmetric, biceps, radialis trace to 1, patellars 1+ b/l  Gait: Normal    Labs:   CSF 1/3/24:  Nucleated cells 255, 75% lymphocytes  protein 119  Glucose 107  PCR not detected    Lyme:  Lyme IgM Ab: 0.935 Index (equivocal)  Lyme IgG > 45 (positive)  Lyme IgG/IgM Abs 8.93  Lyme C6 interpretation (positive)      01-03 Chol 121 LDL -- HDL 27<L> Trig 99    Radiology report:  -CTA head and neck 1/2/24:   CTA BRAIN: Atheromatous irregularity along the cavernous ICAs   bilaterally. Otherwise, no flow-limiting stenosis or occlusion.    CTA NECK: Patent cervical vasculature. No flow limiting stenosis or   occlusion.    MR cervical spine 1/3/24:   1. Straightening of lordosis can be seen in the setting of muscle spasm.  2. C3-C4 and C4-C5 disc bulges, resulting in mild left neural foramen   stenosis with uncovertebral spurring.  3. C5-C6 right paracentral disc protrusion superimposed upon a disc   bulge, impinging upon the thecal sac, resulting in mild central canal and   mild left neural foramen stenosis with uncovertebral spurring.  4. No cord compression or gross altered cord signal.  5. Additional findings described in detail above.    MRI brain with contrast 1/3/24:  Study is a foreshortened examination.  Enhancement of the distal canalicular segments and faint enhancement of   the mastoid and extraocular temporal segments of the bilateral facial   nerves. Note that in some instances, enhancement of the facial nerves can   be within normal limits. However, given patient's symptoms, an   inflammatory process is suspected.

## 2024-01-06 NOTE — PROGRESS NOTE ADULT - ASSESSMENT
Mr. Miranda is a 58y Male with significant PMH of DM-1 on insulin pump, Hypothyroidism, HTN and HPL presented in ED with right sided facial weakness, intermittent double and blurry vision of the right eye, val-oraland right sided tongue numbness which started on 1/1. He came to the ED on 1/2 after being sent by orthopedics when he went for an outpatient visit.     # Lyme meningitis  # Lyme disease  # B/l facial palsy  # Acute meningitis   - CVA has been ruled out  - on ceftriaxone 2 gm IV qd # 2  - ESR -  normal   - Continue IV solumedrol   - Abnormal LP with high WBC  - may need longer term IV abx coverage   - plan for midline  - Neurology and ID following    # DMI  - continue with DM pump    # DVT proph   Mr. Miranda is a 58y Male with significant PMH of DM-1 on insulin pump, Hypothyroidism, HTN and HPL presented in ED with right sided facial weakness, intermittent double and blurry vision of the right eye, val-oraland right sided tongue numbness which started on 1/1. He came to the ED on 1/2 after being sent by orthopedics when he went for an outpatient visit.     # Lyme meningitis  # Lyme disease  # B/l facial palsy  # Acute meningitis   - CVA has been ruled out  - on ceftriaxone 2 gm IV qd # 3  - ESR -  normal   - Continue IV solumedrol   - Abnormal LP with high WBC  - plan for midline Monday   - Neurology and ID following    # DMI  - continue with DM pump    # DVT proph: Lovenox

## 2024-01-07 LAB
ANION GAP SERPL CALC-SCNC: 5 MMOL/L — SIGNIFICANT CHANGE UP (ref 5–17)
ANION GAP SERPL CALC-SCNC: 5 MMOL/L — SIGNIFICANT CHANGE UP (ref 5–17)
BUN SERPL-MCNC: 23 MG/DL — SIGNIFICANT CHANGE UP (ref 7–23)
BUN SERPL-MCNC: 23 MG/DL — SIGNIFICANT CHANGE UP (ref 7–23)
CALCIUM SERPL-MCNC: 8.5 MG/DL — SIGNIFICANT CHANGE UP (ref 8.5–10.1)
CALCIUM SERPL-MCNC: 8.5 MG/DL — SIGNIFICANT CHANGE UP (ref 8.5–10.1)
CHLORIDE SERPL-SCNC: 106 MMOL/L — SIGNIFICANT CHANGE UP (ref 96–108)
CHLORIDE SERPL-SCNC: 106 MMOL/L — SIGNIFICANT CHANGE UP (ref 96–108)
CO2 SERPL-SCNC: 30 MMOL/L — SIGNIFICANT CHANGE UP (ref 22–31)
CO2 SERPL-SCNC: 30 MMOL/L — SIGNIFICANT CHANGE UP (ref 22–31)
CREAT SERPL-MCNC: 0.72 MG/DL — SIGNIFICANT CHANGE UP (ref 0.5–1.3)
CREAT SERPL-MCNC: 0.72 MG/DL — SIGNIFICANT CHANGE UP (ref 0.5–1.3)
EGFR: 105 ML/MIN/1.73M2 — SIGNIFICANT CHANGE UP
EGFR: 105 ML/MIN/1.73M2 — SIGNIFICANT CHANGE UP
GLUCOSE BLDC GLUCOMTR-MCNC: 161 MG/DL — HIGH (ref 70–99)
GLUCOSE BLDC GLUCOMTR-MCNC: 161 MG/DL — HIGH (ref 70–99)
GLUCOSE BLDC GLUCOMTR-MCNC: 194 MG/DL — HIGH (ref 70–99)
GLUCOSE BLDC GLUCOMTR-MCNC: 194 MG/DL — HIGH (ref 70–99)
GLUCOSE BLDC GLUCOMTR-MCNC: 213 MG/DL — HIGH (ref 70–99)
GLUCOSE BLDC GLUCOMTR-MCNC: 213 MG/DL — HIGH (ref 70–99)
GLUCOSE BLDC GLUCOMTR-MCNC: 237 MG/DL — HIGH (ref 70–99)
GLUCOSE BLDC GLUCOMTR-MCNC: 237 MG/DL — HIGH (ref 70–99)
GLUCOSE SERPL-MCNC: 178 MG/DL — HIGH (ref 70–99)
GLUCOSE SERPL-MCNC: 178 MG/DL — HIGH (ref 70–99)
HCT VFR BLD CALC: 39.4 % — SIGNIFICANT CHANGE UP (ref 39–50)
HCT VFR BLD CALC: 39.4 % — SIGNIFICANT CHANGE UP (ref 39–50)
HGB BLD-MCNC: 13.6 G/DL — SIGNIFICANT CHANGE UP (ref 13–17)
HGB BLD-MCNC: 13.6 G/DL — SIGNIFICANT CHANGE UP (ref 13–17)
MCHC RBC-ENTMCNC: 30.8 PG — SIGNIFICANT CHANGE UP (ref 27–34)
MCHC RBC-ENTMCNC: 30.8 PG — SIGNIFICANT CHANGE UP (ref 27–34)
MCHC RBC-ENTMCNC: 34.5 GM/DL — SIGNIFICANT CHANGE UP (ref 32–36)
MCHC RBC-ENTMCNC: 34.5 GM/DL — SIGNIFICANT CHANGE UP (ref 32–36)
MCV RBC AUTO: 89.1 FL — SIGNIFICANT CHANGE UP (ref 80–100)
MCV RBC AUTO: 89.1 FL — SIGNIFICANT CHANGE UP (ref 80–100)
PLATELET # BLD AUTO: 287 K/UL — SIGNIFICANT CHANGE UP (ref 150–400)
PLATELET # BLD AUTO: 287 K/UL — SIGNIFICANT CHANGE UP (ref 150–400)
POTASSIUM SERPL-MCNC: 3.7 MMOL/L — SIGNIFICANT CHANGE UP (ref 3.5–5.3)
POTASSIUM SERPL-MCNC: 3.7 MMOL/L — SIGNIFICANT CHANGE UP (ref 3.5–5.3)
POTASSIUM SERPL-SCNC: 3.7 MMOL/L — SIGNIFICANT CHANGE UP (ref 3.5–5.3)
POTASSIUM SERPL-SCNC: 3.7 MMOL/L — SIGNIFICANT CHANGE UP (ref 3.5–5.3)
RBC # BLD: 4.42 M/UL — SIGNIFICANT CHANGE UP (ref 4.2–5.8)
RBC # BLD: 4.42 M/UL — SIGNIFICANT CHANGE UP (ref 4.2–5.8)
RBC # FLD: 13.1 % — SIGNIFICANT CHANGE UP (ref 10.3–14.5)
RBC # FLD: 13.1 % — SIGNIFICANT CHANGE UP (ref 10.3–14.5)
SODIUM SERPL-SCNC: 141 MMOL/L — SIGNIFICANT CHANGE UP (ref 135–145)
SODIUM SERPL-SCNC: 141 MMOL/L — SIGNIFICANT CHANGE UP (ref 135–145)
WBC # BLD: 10.09 K/UL — SIGNIFICANT CHANGE UP (ref 3.8–10.5)
WBC # BLD: 10.09 K/UL — SIGNIFICANT CHANGE UP (ref 3.8–10.5)
WBC # FLD AUTO: 10.09 K/UL — SIGNIFICANT CHANGE UP (ref 3.8–10.5)
WBC # FLD AUTO: 10.09 K/UL — SIGNIFICANT CHANGE UP (ref 3.8–10.5)

## 2024-01-07 PROCEDURE — 99232 SBSQ HOSP IP/OBS MODERATE 35: CPT

## 2024-01-07 RX ADMIN — CEFTRIAXONE 2000 MILLIGRAM(S): 500 INJECTION, POWDER, FOR SOLUTION INTRAMUSCULAR; INTRAVENOUS at 10:27

## 2024-01-07 RX ADMIN — Medication 81 MILLIGRAM(S): at 10:27

## 2024-01-07 RX ADMIN — Medication 40 MILLIGRAM(S): at 22:53

## 2024-01-07 RX ADMIN — ATORVASTATIN CALCIUM 40 MILLIGRAM(S): 80 TABLET, FILM COATED ORAL at 22:53

## 2024-01-07 RX ADMIN — Medication 50 MICROGRAM(S): at 06:24

## 2024-01-07 RX ADMIN — PANTOPRAZOLE SODIUM 40 MILLIGRAM(S): 20 TABLET, DELAYED RELEASE ORAL at 06:24

## 2024-01-07 RX ADMIN — LISINOPRIL 20 MILLIGRAM(S): 2.5 TABLET ORAL at 10:27

## 2024-01-07 RX ADMIN — Medication 40 MILLIGRAM(S): at 10:27

## 2024-01-07 RX ADMIN — ENOXAPARIN SODIUM 40 MILLIGRAM(S): 100 INJECTION SUBCUTANEOUS at 04:28

## 2024-01-07 NOTE — PROGRESS NOTE ADULT - SUBJECTIVE AND OBJECTIVE BOX
Date of service: 01-07-24 @ 11:44    OOB to chair  Has b/l facial weakness  has diplopia  No fever    ROS: no fever or chills; denies dizziness, no HA, no SOB or cough, no abdominal pain, no diarrhea or constipation; no dysuria, no legs pain, no rashes    MEDICATIONS  (STANDING):  aspirin enteric coated 81 milliGRAM(s) Oral daily  atorvastatin 40 milliGRAM(s) Oral at bedtime  cefTRIAXone Injectable. 2000 milliGRAM(s) IV Push <User Schedule>  dextrose 5%. 1000 milliLiter(s) (50 mL/Hr) IV Continuous <Continuous>  dextrose 5%. 1000 milliLiter(s) (100 mL/Hr) IV Continuous <Continuous>  dextrose 50% Injectable 25 Gram(s) IV Push once  dextrose 50% Injectable 12.5 Gram(s) IV Push once  dextrose 50% Injectable 25 Gram(s) IV Push once  enoxaparin Injectable 40 milliGRAM(s) SubCutaneous every 24 hours  glucagon  Injectable 1 milliGRAM(s) IntraMuscular once  insulin lispro (HumaLOG) Pump 1 Each SubCutaneous Continuous Pump  levothyroxine 50 MICROGram(s) Oral daily  lisinopril 20 milliGRAM(s) Oral daily  methylPREDNISolone sodium succinate Injectable 40 milliGRAM(s) IV Push every 12 hours  pantoprazole    Tablet 40 milliGRAM(s) Oral before breakfast    Vital Signs Last 24 Hrs  T(C): 36.4 (07 Jan 2024 09:30), Max: 36.9 (06 Jan 2024 20:51)  T(F): 97.6 (07 Jan 2024 09:30), Max: 98.5 (06 Jan 2024 20:51)  HR: 62 (07 Jan 2024 09:30) (60 - 79)  BP: 134/59 (07 Jan 2024 09:30) (112/53 - 134/59)  BP(mean): --  RR: 18 (07 Jan 2024 09:30) (18 - 18)  SpO2: 100% (07 Jan 2024 09:30) (97% - 100%)    Parameters below as of 07 Jan 2024 09:30  Patient On (Oxygen Delivery Method): room air     Physical exam:    Constitutional:  No acute distress  HEENT: NC/AT, EOMI, PERRLA, conjunctivae clear; ears and nose atraumatic  b/l facial palsy  Neck: supple; thyroid not palpable  Back: no tenderness  Respiratory: respiratory effort normal; clear to auscultation  Cardiovascular: S1S2 regular, no murmurs  Abdomen: soft, not tender, not distended, positive BS  Genitourinary: no suprapubic tenderness  Lymphatic: no LN palpable  Musculoskeletal: no muscle tenderness, no joint swelling or tenderness  Extremities: no pedal edema  Neurological/ Psychiatric: AxOx3, moving all extremities  Skin: no rashes; no palpable lesions    Labs: reviewed                        13.6   10.09 )-----------( 287      ( 07 Jan 2024 08:25 )             39.4     01-07    141  |  106  |  23  ----------------------------<  178<H>  3.7   |  30  |  0.72    Ca    8.5      07 Jan 2024 08:25    C-Reactive Protein, Serum: <3 mg/L (01-03-24 @ 07:50)                        14.5   5.61  )-----------( 283      ( 04 Jan 2024 07:54 )             42.6     01-04    138  |  104  |  12  ----------------------------<  156<H>  4.0   |  30  |  0.68    Ca    9.0      04 Jan 2024 07:54    TPro  x   /  Alb  3676  /  TBili  x   /  DBili  x   /  AST  x   /  ALT  x   /  AlkPhos  x   01-03  C-Reactive Protein, Serum: <3 mg/L (01-03-24 @ 07:50)    TPro  7.8  /  Alb  3.6  /  TBili  0.3  /  DBili  x   /  AST  28  /  ALT  35  /  AlkPhos  72  01-02     LIVER FUNCTIONS - ( 02 Jan 2024 14:38 )  Alb: 3.6 g/dL / Pro: 7.8 gm/dL / ALK PHOS: 72 U/L / ALT: 35 U/L / AST: 28 U/L / GGT: x           Lyme IG G and IgM detected   Babesia microti PCR, Bld (collected 03 Jan 2024 21:39)    Babesia microti PCR, Bld (collected 03 Jan 2024 21:39)    Culture - CSF with Gram Stain (collected 03 Jan 2024 19:00)  Source: .CSF  Gram Stain (03 Jan 2024 23:31):    polymorphonuclear leukocytes seen    No organisms seen    by cytocentrifuge  Preliminary Report (04 Jan 2024 15:53):    No growth    Culture - Urine (collected 02 Jan 2024 16:15)  Source: Clean Catch Clean Catch (Midstream)  Final Report (03 Jan 2024 14:56):    No growth    Radiology: all available radiological tests reviewed    Advanced directives addressed: full resuscitation

## 2024-01-07 NOTE — PROGRESS NOTE ADULT - SUBJECTIVE AND OBJECTIVE BOX
Patient able to move some facial muscles, still has difficulty chewing and pain in left side lower jaw. Irritation in the right eye is less.  He denies headache, double vision, LOC.  Is walking independently.    Patient continues to be on ceftriaxone IV, is followed up with ID.    CSF Lyme/ACE - pending    ROS: As above, other ROS Negative      MEDICATIONS  (STANDING):  aspirin enteric coated 81 milliGRAM(s) Oral daily  atorvastatin 40 milliGRAM(s) Oral at bedtime  cefTRIAXone Injectable. 2000 milliGRAM(s) IV Push <User Schedule>  dextrose 5%. 1000 milliLiter(s) (50 mL/Hr) IV Continuous <Continuous>  enoxaparin Injectable 40 milliGRAM(s) SubCutaneous every 24 hours  glucagon  Injectable 1 milliGRAM(s) IntraMuscular once  insulin lispro (HumaLOG) Pump 1 Each SubCutaneous Continuous Pump  levothyroxine 50 MICROGram(s) Oral daily  lisinopril 20 milliGRAM(s) Oral daily  methylPREDNISolone sodium succinate Injectable 40 milliGRAM(s) IV Push every 12 hours  pantoprazole    Tablet 40 milliGRAM(s) Oral before breakfast      Vital Signs Last 24 Hrs  T(C): 36.4 (07 Jan 2024 09:30), Max: 36.9 (06 Jan 2024 20:51)  T(F): 97.6 (07 Jan 2024 09:30), Max: 98.5 (06 Jan 2024 20:51)  HR: 62 (07 Jan 2024 09:30) (60 - 79)  BP: 134/59 (07 Jan 2024 09:30) (112/53 - 134/59)  BP(mean): --  RR: 18 (07 Jan 2024 09:30) (18 - 18)  SpO2: 100% (07 Jan 2024 09:30) (97% - 100%)    Parameters below as of 07 Jan 2024 09:30  Patient On (Oxygen Delivery Method): room air      GENERAL: NAD, well-groomed  HEAD:  Atraumatic, Normocephalic  Neuro:  HF: Awake, alert, no aphasia, mild dysarthria  CN: PERRL, EOMI, lagophthalmos - mild right, moderate left side, b/l facial weakness - LMN type, left > right side, Can protrude tongue. Palate rises symmetrically.   Motor: normal tone, no pronator drift, full strength in all four extremities  Sensory: intact to light touch  Coordination: finger to nose intact bilaterally  DTRs: symmetric, biceps, radialis trace to 1, patellars 1+ b/l  Gait: Normal    Labs:   CSF 1/3/24:  Nucleated cells 255, 75% lymphocytes  protein 119  Glucose 107  PCR not detected    Lyme:  Lyme IgM Ab: 0.935 Index (equivocal)  Lyme IgG > 45 (positive)  Lyme IgG/IgM Abs 8.93  Lyme C6 interpretation (positive)                        13.6   10.09 )-----------( 287      ( 07 Jan 2024 08:25 )             39.4     01-07    141  |  106  |  23  ----------------------------<  178<H>  3.7   |  30  |  0.72    Ca    8.5      07 Jan 2024 08:25    01-03 Chol 121 LDL -- HDL 27<L> Trig 99    Radiology report:  -CTA head and neck 1/2/24:   CTA BRAIN: Atheromatous irregularity along the cavernous ICAs   bilaterally. Otherwise, no flow-limiting stenosis or occlusion.    CTA NECK: Patent cervical vasculature. No flow limiting stenosis or   occlusion.    MR cervical spine 1/3/24:   1. Straightening of lordosis can be seen in the setting of muscle spasm.  2. C3-C4 and C4-C5 disc bulges, resulting in mild left neural foramen   stenosis with uncovertebral spurring.  3. C5-C6 right paracentral disc protrusion superimposed upon a disc   bulge, impinging upon the thecal sac, resulting in mild central canal and   mild left neural foramen stenosis with uncovertebral spurring.  4. No cord compression or gross altered cord signal.  5. Additional findings described in detail above.    MRI brain with contrast 1/3/24:  Study is a foreshortened examination.  Enhancement of the distal canalicular segments and faint enhancement of   the mastoid and extraocular temporal segments of the bilateral facial   nerves. Note that in some instances, enhancement of the facial nerves can   be within normal limits. However, given patient's symptoms, an   inflammatory process is suspected.

## 2024-01-07 NOTE — PROGRESS NOTE ADULT - ASSESSMENT
58y Male with significant PMH of DM-1 on insulin pump, Hypothyroidism, HTN and HPL presented in ED with right sided facial weakness, intermittent double and blurry vision of the right eye, val-oral and right sided tongue numbness which started on 1/1, came to the ED on 1/2 after being sent by orthopedics when he went for an outpatient visit. Facial weakness progressed to the right side as well.    # Bilateral 7th cranial neuropathies; Boon' palsy left > right side; enhancement of b/l facial nerves are seen on MR with contrast, no significant stenosis, vascular malformations on CTA    # Most likely aseptic meningitis - lyme related as serum positive for Lyme - IgG > IgM; CSF protein 119, Cells 225, primarily lymphs 75%; rule out WNV      -Continue IV ceftriaxone  -Continue solumedrol 40 mg q12  -f/u CSF lyme, ACE and WNV  - F/U with SLP    Above D/W pt, his daughter and Dr. Jaramillo 58y Male with significant PMH of DM-1 on insulin pump, Hypothyroidism, HTN and HPL presented in ED with right sided facial weakness, intermittent double and blurry vision of the right eye, val-oral and right sided tongue numbness which started on 1/1, came to the ED on 1/2 after being sent by orthopedics when he went for an outpatient visit. Facial weakness progressed to the right side as well.    # Bilateral 7th cranial neuropathies; Hurt' palsy left > right side; enhancement of b/l facial nerves are seen on MR with contrast, no significant stenosis, vascular malformations on CTA    # Most likely aseptic meningitis - lyme related as serum positive for Lyme - IgG > IgM; CSF protein 119, Cells 225, primarily lymphs 75%; rule out WNV      -Continue IV ceftriaxone  -Continue solumedrol 40 mg q12  -f/u CSF lyme, ACE and WNV  - F/U with SLP    Above D/W pt, his daughter and Dr. Jaramillo

## 2024-01-07 NOTE — PROGRESS NOTE ADULT - ASSESSMENT
Mr. Miranda is a 58y Male with significant PMH of DM-1 on insulin pump, Hypothyroidism, HTN and HPL presented in ED with right sided facial weakness, intermittent double and blurry vision of the right eye, val-oraland right sided tongue numbness which started on 1/1. He came to the ED on 1/2 after being sent by orthopedics when he went for an outpatient visit.     # Lyme meningitis  # Lyme disease  # B/l facial palsy  # Acute meningitis   - CVA has been ruled out  - on ceftriaxone 2 gm IV qd # 4  - ESR -  normal   - Continue IV solumedrol   - Abnormal LP with high WBC  - plan for midline Monday   - Neurology and ID following    # DMI  - continue with DM pump    # DVT proph: Lovenox   Mr. Miranda is a 58y Male with significant PMH of DM-1 on insulin pump, Hypothyroidism, HTN and HPL presented in ED with right sided facial weakness, intermittent double and blurry vision of the right eye, val-oraland right sided tongue numbness which started on 1/1. He came to the ED on 1/2 after being sent by orthopedics when he went for an outpatient visit.     # Lyme meningitis  # Lyme disease  # B/l facial palsy  # Acute meningitis   - CVA has been ruled out  - on ceftriaxone 2 gm IV qd # 4  - ESR -  normal   - Continue IV solumedrol   - Abnormal LP with high WBC  - plan for midline tomorrow with d/c home for long term abx  - Neurology and ID following    # DMI  - continue with DM pump    # DVT proph: Lovenox  Lab holiday tomorrow

## 2024-01-07 NOTE — PROGRESS NOTE ADULT - ASSESSMENT
59 y/o Male with h/o DM-1 on insulin pump, Hypothyroidism, HTN and HPL was admitted on 1/24 for b/l weakness to right face, inability to close right eyes, intermittent double and blurry vision of right eye x one day. He also has some slurred and garbled speech per his wife. He also feels numbness to right side of his tongue. His gait was also off balance. He also has some upper back pain for last 7-10 days, and he was seen here in ED on Dec 26, and he was given Methocarbamol and Diclofenac which he was taking with any relief in his upper back pain. He was seen in an office as outpatient by an Orthopedic Spine surgeon Dr. Jorden Blas, and he was referred to ED due to blurry vision and diplopia. On 1/3 he ws noted with Lyme AB detected and given doxycycline, then ceftriaxone.     1. Lyme disease. B/l facial palsy. Acute meningitis. 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  -face feels less stiff  -CSF analysis reviewed  -probable tick exposure several weeks/ months ago  -on ceftriaxone 2 gm IV qd # 4  -tolerating abx well so far; no side effects noted  -neurology evaluation appreciated  -steroids per neuro  -f/u Lyme CSF serology  -f/u cultures  -continue abx coverage  -will home IV abx coverage for 3 more weeks  -plan for midline  -monitor temps  -f/u CBC  -supportive care  2. Other issues:   -care per medicine    d/w wife and daughter at bedside  All questions answered     57 y/o Male with h/o DM-1 on insulin pump, Hypothyroidism, HTN and HPL was admitted on 1/24 for b/l weakness to right face, inability to close right eyes, intermittent double and blurry vision of right eye x one day. He also has some slurred and garbled speech per his wife. He also feels numbness to right side of his tongue. His gait was also off balance. He also has some upper back pain for last 7-10 days, and he was seen here in ED on Dec 26, and he was given Methocarbamol and Diclofenac which he was taking with any relief in his upper back pain. He was seen in an office as outpatient by an Orthopedic Spine surgeon Dr. Jorden Blas, and he was referred to ED due to blurry vision and diplopia. On 1/3 he ws noted with Lyme AB detected and given doxycycline, then ceftriaxone.     1. Lyme disease. B/l facial palsy. Acute meningitis. 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  -face feels less stiff  -CSF analysis reviewed  -probable tick exposure several weeks/ months ago  -on ceftriaxone 2 gm IV qd # 4  -tolerating abx well so far; no side effects noted  -neurology evaluation appreciated  -steroids per neuro  -f/u Lyme CSF serology  -f/u cultures  -continue abx coverage  -will home IV abx coverage for 3 more weeks  -plan for midline  -monitor temps  -f/u CBC  -supportive care  2. Other issues:   -care per medicine    d/w wife and daughter at bedside  All questions answered

## 2024-01-07 NOTE — PROGRESS NOTE ADULT - SUBJECTIVE AND OBJECTIVE BOX
incomplete note    HOSPITALIST PROGRESS NOTE    HPI: The patient is a 58y Male with significant PMH of DM-1 on insulin pump, Hypothyroidism, HTN and HPL presented in ED with c/o weakness to right face, inability to close right eyes, intermittent double and blurry vision of right eye since today morning.  Patient's wife Ms. Burrell present at bedside in ED. He also has some slurred and garbled speech per his wife. He also feels numbness to right side of his tongue. His gait was also off balance. He also has some upper back pain for last 7-10 days, and he was seen here in ED on Dec 26, and he was given Methocarbamol and Diclofenac which he was taking with any relief in his upper back pain. He denies any fall or injury to his spine.  Today he was seen in an office as outpatient by an Orthopedic Spine surgeon Dr. Jorden Blas, and he was referred to ED due to blurry vision and diplopia. Upon arrival in ED, his NIH SS was 2. No code stroke in ED. His CBC and CMP unremarkable except BS of 242. Mg, Troponin and Phosphorus level wnl. UA negative.  CT angio Head and Neck unremarkable.  CT angio Chest, abdomen and Pelvis negative for any acute process negative aortic syndrome.    SUBJECTIVE / INTERVAL HPI: Patient seen and examined at bedside. Patient feels as if his facial muscles are getting stronger. No complaints at this time.     VITAL SIGNS:  Vital Signs Last 24 Hrs  T(C): 36.4 (07 Jan 2024 09:30), Max: 36.9 (06 Jan 2024 20:51)  T(F): 97.6 (07 Jan 2024 09:30), Max: 98.5 (06 Jan 2024 20:51)  HR: 62 (07 Jan 2024 09:30) (60 - 79)  BP: 134/59 (07 Jan 2024 09:30) (112/53 - 134/59)  BP(mean): --  RR: 18 (07 Jan 2024 09:30) (18 - 18)  SpO2: 100% (07 Jan 2024 09:30) (97% - 100%)    Parameters below as of 07 Jan 2024 09:30  Patient On (Oxygen Delivery Method): room air      PHYSICAL EXAM:  General: No acute distress  HEENT: NC/AT; PERRL, anicteric sclera; MMM  Neck: Supple  Cardiovascular: +S1/S2, RRR, no murmurs, rubs, gallops  Respiratory: CTA B/L; no W/R/R  Gastrointestinal: soft, NT/ND; +BSx4  Extremities: WWP; no edema, clubbing or cyanosis  Vascular: 2+ radial, DP/PT pulses B/L  Neurological: AOx3.+bilateral facial muscle weakness L weaker than R. Extremities with 5/5 strength [id number]    MEDICATIONS:  MEDICATIONS  (STANDING):  aspirin enteric coated 81 milliGRAM(s) Oral daily  atorvastatin 40 milliGRAM(s) Oral at bedtime  cefTRIAXone Injectable. 2000 milliGRAM(s) IV Push <User Schedule>  dextrose 5%. 1000 milliLiter(s) (50 mL/Hr) IV Continuous <Continuous>  dextrose 5%. 1000 milliLiter(s) (100 mL/Hr) IV Continuous <Continuous>  dextrose 50% Injectable 25 Gram(s) IV Push once  dextrose 50% Injectable 12.5 Gram(s) IV Push once  dextrose 50% Injectable 25 Gram(s) IV Push once  enoxaparin Injectable 40 milliGRAM(s) SubCutaneous every 24 hours  glucagon  Injectable 1 milliGRAM(s) IntraMuscular once  insulin lispro (HumaLOG) Pump 1 Each SubCutaneous Continuous Pump  levothyroxine 50 MICROGram(s) Oral daily  lisinopril 20 milliGRAM(s) Oral daily  methylPREDNISolone sodium succinate Injectable 40 milliGRAM(s) IV Push every 12 hours  pantoprazole    Tablet 40 milliGRAM(s) Oral before breakfast    MEDICATIONS  (PRN):  acetaminophen     Tablet .. 650 milliGRAM(s) Oral every 6 hours PRN Mild Pain (1 - 3)  artificial tears (preservative free) Ophthalmic Solution 1 Drop(s) Both EYES four times a day PRN Dry Eyes  dextrose Oral Gel 15 Gram(s) Oral once PRN Blood Glucose LESS THAN 70 milliGRAM(s)/deciliter  ondansetron Injectable 4 milliGRAM(s) IV Push every 8 hours PRN Nausea and/or Vomiting      ALLERGIES:  Allergies    No Known Allergies    Intolerances        LABS:                        13.6   10.09 )-----------( 287      ( 07 Jan 2024 08:25 )             39.4     01-07    141  |  106  |  23  ----------------------------<  178<H>  3.7   |  30  |  0.72    Ca    8.5      07 Jan 2024 08:25        Urinalysis Basic - ( 07 Jan 2024 08:25 )    Color: x / Appearance: x / SG: x / pH: x  Gluc: 178 mg/dL / Ketone: x  / Bili: x / Urobili: x   Blood: x / Protein: x / Nitrite: x   Leuk Esterase: x / RBC: x / WBC x   Sq Epi: x / Non Sq Epi: x / Bacteria: x      CAPILLARY BLOOD GLUCOSE      POCT Blood Glucose.: 161 mg/dL (07 Jan 2024 08:30)        RADIOLOGY & ADDITIONAL TESTS: Reviewed. incomplete note    HOSPITALIST PROGRESS NOTE    HPI: The patient is a 58y Male with significant PMH of DM-1 on insulin pump, Hypothyroidism, HTN and HPL presented in ED with c/o weakness to right face, inability to close right eyes, intermittent double and blurry vision of right eye since today morning.  Patient's wife Ms. Burrell present at bedside in ED. He also has some slurred and garbled speech per his wife. He also feels numbness to right side of his tongue. His gait was also off balance. He also has some upper back pain for last 7-10 days, and he was seen here in ED on Dec 26, and he was given Methocarbamol and Diclofenac which he was taking with any relief in his upper back pain. He denies any fall or injury to his spine.  Today he was seen in an office as outpatient by an Orthopedic Spine surgeon Dr. Jorden Blas, and he was referred to ED due to blurry vision and diplopia. Upon arrival in ED, his NIH SS was 2. No code stroke in ED. His CBC and CMP unremarkable except BS of 242. Mg, Troponin and Phosphorus level wnl. UA negative.  CT angio Head and Neck unremarkable.  CT angio Chest, abdomen and Pelvis negative for any acute process negative aortic syndrome.    SUBJECTIVE / INTERVAL HPI: Patient seen and examined at bedside. Patient feels as if his facial muscles are getting stronger. No complaints at this time.     VITAL SIGNS:  Vital Signs Last 24 Hrs  T(C): 36.4 (07 Jan 2024 09:30), Max: 36.9 (06 Jan 2024 20:51)  T(F): 97.6 (07 Jan 2024 09:30), Max: 98.5 (06 Jan 2024 20:51)  HR: 62 (07 Jan 2024 09:30) (60 - 79)  BP: 134/59 (07 Jan 2024 09:30) (112/53 - 134/59)  BP(mean): --  RR: 18 (07 Jan 2024 09:30) (18 - 18)  SpO2: 100% (07 Jan 2024 09:30) (97% - 100%)    Parameters below as of 07 Jan 2024 09:30  Patient On (Oxygen Delivery Method): room air      PHYSICAL EXAM:  General: No acute distress  HEENT: NC/AT; PERRL, anicteric sclera; MMM  Neck: Supple  Cardiovascular: +S1/S2, RRR, no murmurs, rubs, gallops  Respiratory: CTA B/L; no W/R/R  Gastrointestinal: soft, NT/ND; +BSx4  Extremities: WWP; no edema, clubbing or cyanosis  Vascular: 2+ radial, DP/PT pulses B/L  Neurological: AOx3.+bilateral facial muscle weakness L weaker than R. Extremities with 5/5 strength [id number]    MEDICATIONS:  MEDICATIONS  (STANDING):  aspirin enteric coated 81 milliGRAM(s) Oral daily  atorvastatin 40 milliGRAM(s) Oral at bedtime  cefTRIAXone Injectable. 2000 milliGRAM(s) IV Push <User Schedule>  dextrose 5%. 1000 milliLiter(s) (50 mL/Hr) IV Continuous <Continuous>  dextrose 5%. 1000 milliLiter(s) (100 mL/Hr) IV Continuous <Continuous>  dextrose 50% Injectable 25 Gram(s) IV Push once  dextrose 50% Injectable 12.5 Gram(s) IV Push once  dextrose 50% Injectable 25 Gram(s) IV Push once  enoxaparin Injectable 40 milliGRAM(s) SubCutaneous every 24 hours  glucagon  Injectable 1 milliGRAM(s) IntraMuscular once  insulin lispro (HumaLOG) Pump 1 Each SubCutaneous Continuous Pump  levothyroxine 50 MICROGram(s) Oral daily  lisinopril 20 milliGRAM(s) Oral daily  methylPREDNISolone sodium succinate Injectable 40 milliGRAM(s) IV Push every 12 hours  pantoprazole    Tablet 40 milliGRAM(s) Oral before breakfast    MEDICATIONS  (PRN):  acetaminophen     Tablet .. 650 milliGRAM(s) Oral every 6 hours PRN Mild Pain (1 - 3)  artificial tears (preservative free) Ophthalmic Solution 1 Drop(s) Both EYES four times a day PRN Dry Eyes  dextrose Oral Gel 15 Gram(s) Oral once PRN Blood Glucose LESS THAN 70 milliGRAM(s)/deciliter  ondansetron Injectable 4 milliGRAM(s) IV Push every 8 hours PRN Nausea and/or Vomiting      ALLERGIES:  Allergies    No Known Allergies    Intolerances        LABS:                        13.6   10.09 )-----------( 287      ( 07 Jan 2024 08:25 )             39.4     01-07    141  |  106  |  23  ----------------------------<  178<H>  3.7   |  30  |  0.72    Ca    8.5      07 Jan 2024 08:25        Urinalysis Basic - ( 07 Jan 2024 08:25 )    Color: x / Appearance: x / SG: x / pH: x  Gluc: 178 mg/dL / Ketone: x  / Bili: x / Urobili: x   Blood: x / Protein: x / Nitrite: x   Leuk Esterase: x / RBC: x / WBC x   Sq Epi: x / Non Sq Epi: x / Bacteria: x      CAPILLARY BLOOD GLUCOSE      POCT Blood Glucose.: 161 mg/dL (07 Jan 2024 08:30)        RADIOLOGY & ADDITIONAL TESTS: Reviewed. HOSPITALIST PROGRESS NOTE    HPI: The patient is a 58y Male with significant PMH of DM-1 on insulin pump, Hypothyroidism, HTN and HPL presented in ED with c/o weakness to right face, inability to close right eyes, intermittent double and blurry vision of right eye since today morning.  Patient's wife Ms. Burrell present at bedside in ED. He also has some slurred and garbled speech per his wife. He also feels numbness to right side of his tongue. His gait was also off balance. He also has some upper back pain for last 7-10 days, and he was seen here in ED on Dec 26, and he was given Methocarbamol and Diclofenac which he was taking with any relief in his upper back pain. He denies any fall or injury to his spine.  Today he was seen in an office as outpatient by an Orthopedic Spine surgeon Dr. Jorden Blas, and he was referred to ED due to blurry vision and diplopia. Upon arrival in ED, his NIH SS was 2. No code stroke in ED. His CBC and CMP unremarkable except BS of 242. Mg, Troponin and Phosphorus level wnl. UA negative.  CT angio Head and Neck unremarkable.  CT angio Chest, abdomen and Pelvis negative for any acute process negative aortic syndrome.    SUBJECTIVE / INTERVAL HPI: Patient seen and examined at bedside. Patient still feels as if his facial muscles are getting stronger, however slowly. No complaints at this time.     VITAL SIGNS:  Vital Signs Last 24 Hrs  T(C): 36.4 (07 Jan 2024 09:30), Max: 36.9 (06 Jan 2024 20:51)  T(F): 97.6 (07 Jan 2024 09:30), Max: 98.5 (06 Jan 2024 20:51)  HR: 62 (07 Jan 2024 09:30) (60 - 79)  BP: 134/59 (07 Jan 2024 09:30) (112/53 - 134/59)  BP(mean): --  RR: 18 (07 Jan 2024 09:30) (18 - 18)  SpO2: 100% (07 Jan 2024 09:30) (97% - 100%)    Parameters below as of 07 Jan 2024 09:30  Patient On (Oxygen Delivery Method): room air      PHYSICAL EXAM:  General: No acute distress  HEENT: NC/AT; PERRL, anicteric sclera; MMM  Neck: Supple  Cardiovascular: +S1/S2, RRR, no murmurs, rubs, gallops  Respiratory: CTA B/L; no W/R/R  Gastrointestinal: soft, NT/ND; +BSx4  Extremities: WWP; no edema, clubbing or cyanosis  Vascular: 2+ radial, DP/PT pulses B/L  Neurological: AOx3.+bilateral facial muscle weakness L weaker than R. Extremities with 5/5 strength [id number]    MEDICATIONS:  MEDICATIONS  (STANDING):  aspirin enteric coated 81 milliGRAM(s) Oral daily  atorvastatin 40 milliGRAM(s) Oral at bedtime  cefTRIAXone Injectable. 2000 milliGRAM(s) IV Push <User Schedule>  dextrose 5%. 1000 milliLiter(s) (50 mL/Hr) IV Continuous <Continuous>  dextrose 5%. 1000 milliLiter(s) (100 mL/Hr) IV Continuous <Continuous>  dextrose 50% Injectable 25 Gram(s) IV Push once  dextrose 50% Injectable 12.5 Gram(s) IV Push once  dextrose 50% Injectable 25 Gram(s) IV Push once  enoxaparin Injectable 40 milliGRAM(s) SubCutaneous every 24 hours  glucagon  Injectable 1 milliGRAM(s) IntraMuscular once  insulin lispro (HumaLOG) Pump 1 Each SubCutaneous Continuous Pump  levothyroxine 50 MICROGram(s) Oral daily  lisinopril 20 milliGRAM(s) Oral daily  methylPREDNISolone sodium succinate Injectable 40 milliGRAM(s) IV Push every 12 hours  pantoprazole    Tablet 40 milliGRAM(s) Oral before breakfast    MEDICATIONS  (PRN):  acetaminophen     Tablet .. 650 milliGRAM(s) Oral every 6 hours PRN Mild Pain (1 - 3)  artificial tears (preservative free) Ophthalmic Solution 1 Drop(s) Both EYES four times a day PRN Dry Eyes  dextrose Oral Gel 15 Gram(s) Oral once PRN Blood Glucose LESS THAN 70 milliGRAM(s)/deciliter  ondansetron Injectable 4 milliGRAM(s) IV Push every 8 hours PRN Nausea and/or Vomiting      ALLERGIES:  Allergies    No Known Allergies    Intolerances        LABS:                        13.6   10.09 )-----------( 287      ( 07 Jan 2024 08:25 )             39.4     01-07    141  |  106  |  23  ----------------------------<  178<H>  3.7   |  30  |  0.72    Ca    8.5      07 Jan 2024 08:25        Urinalysis Basic - ( 07 Jan 2024 08:25 )    Color: x / Appearance: x / SG: x / pH: x  Gluc: 178 mg/dL / Ketone: x  / Bili: x / Urobili: x   Blood: x / Protein: x / Nitrite: x   Leuk Esterase: x / RBC: x / WBC x   Sq Epi: x / Non Sq Epi: x / Bacteria: x      CAPILLARY BLOOD GLUCOSE      POCT Blood Glucose.: 161 mg/dL (07 Jan 2024 08:30)        RADIOLOGY & ADDITIONAL TESTS: Reviewed. HOSPITALIST PROGRESS NOTE    HPI: The patient is a 58y Male with significant PMH of DM-1 on insulin pump, Hypothyroidism, HTN and HPL presented in ED with c/o weakness to right face, inability to close right eyes, intermittent double and blurry vision of right eye since today morning.  Patient's wife Ms. Burrell present at bedside in ED. He also has some slurred and garbled speech per his wife. He also feels numbness to right side of his tongue. His gait was also off balance. He also has some upper back pain for last 7-10 days, and he was seen here in ED on Dec 26, and he was given Methocarbamol and Diclofenac which he was taking with any relief in his upper back pain. He denies any fall or injury to his spine.  Today he was seen in an office as outpatient by an Orthopedic Spine surgeon Dr. Jorden Blas, and he was referred to ED due to blurry vision and diplopia. Upon arrival in ED, his NIH SS was 2. No code stroke in ED. His CBC and CMP unremarkable except BS of 242. Mg, Troponin and Phosphorus level wnl. UA negative.  CT angio Head and Neck unremarkable.  CT angio Chest, abdomen and Pelvis negative for any acute process negative aortic syndrome.    SUBJECTIVE / INTERVAL HPI: Patient seen and examined at bedside. Patient still feels as if his facial muscles are getting stronger, however slowly. No complaints at this time.     VITAL SIGNS:  Vital Signs Last 24 Hrs  T(C): 36.4 (07 Jan 2024 09:30), Max: 36.9 (06 Jan 2024 20:51)  T(F): 97.6 (07 Jan 2024 09:30), Max: 98.5 (06 Jan 2024 20:51)  HR: 62 (07 Jan 2024 09:30) (60 - 79)  BP: 134/59 (07 Jan 2024 09:30) (112/53 - 134/59)  BP(mean): --  RR: 18 (07 Jan 2024 09:30) (18 - 18)  SpO2: 100% (07 Jan 2024 09:30) (97% - 100%)    Parameters below as of 07 Jan 2024 09:30  Patient On (Oxygen Delivery Method): room air      PHYSICAL EXAM:  General: No acute distress  HEENT: NC/AT; PERRL, anicteric sclera; MMM  Neck: Supple  Cardiovascular: +S1/S2, RRR, no murmurs, rubs, gallops  Respiratory: CTA B/L; no W/R/R  Gastrointestinal: soft, NT/ND; +BSx4  Extremities: WWP; no edema, clubbing or cyanosis  Vascular: 2+ radial, DP/PT pulses B/L  Neurological: AOx3.+bilateral facial muscle weakness L weaker than R. Extremities with 5/5 strength [id number]    MEDICATIONS:  MEDICATIONS  (STANDING):  aspirin enteric coated 81 milliGRAM(s) Oral daily  atorvastatin 40 milliGRAM(s) Oral at bedtime  cefTRIAXone Injectable. 2000 milliGRAM(s) IV Push <User Schedule>  dextrose 5%. 1000 milliLiter(s) (50 mL/Hr) IV Continuous <Continuous>  dextrose 5%. 1000 milliLiter(s) (100 mL/Hr) IV Continuous <Continuous>  dextrose 50% Injectable 25 Gram(s) IV Push once  dextrose 50% Injectable 12.5 Gram(s) IV Push once  dextrose 50% Injectable 25 Gram(s) IV Push once  enoxaparin Injectable 40 milliGRAM(s) SubCutaneous every 24 hours  glucagon  Injectable 1 milliGRAM(s) IntraMuscular once  insulin lispro (HumaLOG) Pump 1 Each SubCutaneous Continuous Pump  levothyroxine 50 MICROGram(s) Oral daily  lisinopril 20 milliGRAM(s) Oral daily  methylPREDNISolone sodium succinate Injectable 40 milliGRAM(s) IV Push every 12 hours  pantoprazole    Tablet 40 milliGRAM(s) Oral before breakfast    MEDICATIONS  (PRN):  acetaminophen     Tablet .. 650 milliGRAM(s) Oral every 6 hours PRN Mild Pain (1 - 3)  artificial tears (preservative free) Ophthalmic Solution 1 Drop(s) Both EYES four times a day PRN Dry Eyes  dextrose Oral Gel 15 Gram(s) Oral once PRN Blood Glucose LESS THAN 70 milliGRAM(s)/deciliter  ondansetron Injectable 4 milliGRAM(s) IV Push every 8 hours PRN Nausea and/or Vomiting      ALLERGIES:  Allergies    No Known Allergies    Intolerances        LABS:                        13.6   10.09 )-----------( 287      ( 07 Jan 2024 08:25 )             39.4     01-07    141  |  106  |  23  ----------------------------<  178<H>  3.7   |  30  |  0.72    Ca    8.5      07 Jan 2024 08:25        Urinalysis Basic - ( 07 Jan 2024 08:25 )    Color: x / Appearance: x / SG: x / pH: x  Gluc: 178 mg/dL / Ketone: x  / Bili: x / Urobili: x   Blood: x / Protein: x / Nitrite: x   Leuk Esterase: x / RBC: x / WBC x   Sq Epi: x / Non Sq Epi: x / Bacteria: x      CAPILLARY BLOOD GLUCOSE      POCT Blood Glucose.: 161 mg/dL (07 Jan 2024 08:30)        RADIOLOGY & ADDITIONAL TESTS: Reviewed.

## 2024-01-08 ENCOUNTER — TRANSCRIPTION ENCOUNTER (OUTPATIENT)
Age: 59
End: 2024-01-08

## 2024-01-08 VITALS
RESPIRATION RATE: 20 BRPM | SYSTOLIC BLOOD PRESSURE: 140 MMHG | OXYGEN SATURATION: 99 % | DIASTOLIC BLOOD PRESSURE: 60 MMHG | TEMPERATURE: 98 F | HEART RATE: 78 BPM

## 2024-01-08 PROBLEM — Z96.41 PRESENCE OF INSULIN PUMP (EXTERNAL) (INTERNAL): Chronic | Status: ACTIVE | Noted: 2024-01-02

## 2024-01-08 PROBLEM — E10.9 TYPE 1 DIABETES MELLITUS WITHOUT COMPLICATIONS: Chronic | Status: ACTIVE | Noted: 2024-01-02

## 2024-01-08 LAB
CULTURE RESULTS: NO GROWTH — SIGNIFICANT CHANGE UP
CULTURE RESULTS: NO GROWTH — SIGNIFICANT CHANGE UP
FLUAV AG NPH QL: SIGNIFICANT CHANGE UP
FLUAV AG NPH QL: SIGNIFICANT CHANGE UP
FLUBV AG NPH QL: SIGNIFICANT CHANGE UP
FLUBV AG NPH QL: SIGNIFICANT CHANGE UP
GLUCOSE BLDC GLUCOMTR-MCNC: 147 MG/DL — HIGH (ref 70–99)
GLUCOSE BLDC GLUCOMTR-MCNC: 147 MG/DL — HIGH (ref 70–99)
GLUCOSE BLDC GLUCOMTR-MCNC: 224 MG/DL — HIGH (ref 70–99)
GLUCOSE BLDC GLUCOMTR-MCNC: 224 MG/DL — HIGH (ref 70–99)
INNER EAR 68KD AB FLD QL: <1.5 U/L — SIGNIFICANT CHANGE UP (ref 0–3.1)
INNER EAR 68KD AB FLD QL: <1.5 U/L — SIGNIFICANT CHANGE UP (ref 0–3.1)
RSV RNA NPH QL NAA+NON-PROBE: SIGNIFICANT CHANGE UP
RSV RNA NPH QL NAA+NON-PROBE: SIGNIFICANT CHANGE UP
SARS-COV-2 RNA SPEC QL NAA+PROBE: DETECTED
SARS-COV-2 RNA SPEC QL NAA+PROBE: DETECTED
SPECIMEN SOURCE: SIGNIFICANT CHANGE UP
SPECIMEN SOURCE: SIGNIFICANT CHANGE UP
VDRL CSF-TITR: SIGNIFICANT CHANGE UP
VDRL CSF-TITR: SIGNIFICANT CHANGE UP

## 2024-01-08 PROCEDURE — 99232 SBSQ HOSP IP/OBS MODERATE 35: CPT

## 2024-01-08 PROCEDURE — 99239 HOSP IP/OBS DSCHRG MGMT >30: CPT

## 2024-01-08 RX ORDER — INSULIN LISPRO 100/ML
0 VIAL (ML) SUBCUTANEOUS
Qty: 0 | Refills: 0 | DISCHARGE

## 2024-01-08 RX ORDER — ASPIRIN/CALCIUM CARB/MAGNESIUM 324 MG
1 TABLET ORAL
Qty: 0 | Refills: 0 | DISCHARGE
Start: 2024-01-08

## 2024-01-08 RX ORDER — PANTOPRAZOLE SODIUM 20 MG/1
1 TABLET, DELAYED RELEASE ORAL
Qty: 7 | Refills: 0
Start: 2024-01-08 | End: 2024-01-14

## 2024-01-08 RX ADMIN — CEFTRIAXONE 2000 MILLIGRAM(S): 500 INJECTION, POWDER, FOR SOLUTION INTRAMUSCULAR; INTRAVENOUS at 09:33

## 2024-01-08 RX ADMIN — LISINOPRIL 20 MILLIGRAM(S): 2.5 TABLET ORAL at 09:33

## 2024-01-08 RX ADMIN — Medication 40 MILLIGRAM(S): at 09:33

## 2024-01-08 RX ADMIN — ENOXAPARIN SODIUM 40 MILLIGRAM(S): 100 INJECTION SUBCUTANEOUS at 04:36

## 2024-01-08 RX ADMIN — PANTOPRAZOLE SODIUM 40 MILLIGRAM(S): 20 TABLET, DELAYED RELEASE ORAL at 06:52

## 2024-01-08 RX ADMIN — Medication 50 MICROGRAM(S): at 06:31

## 2024-01-08 RX ADMIN — Medication 81 MILLIGRAM(S): at 09:33

## 2024-01-08 NOTE — PROGRESS NOTE ADULT - ASSESSMENT
57 y/o Male with h/o DM-1 on insulin pump, Hypothyroidism, HTN and HPL was admitted on 1/24 for b/l weakness to right face, inability to close right eyes, intermittent double and blurry vision of right eye x one day. He also has some slurred and garbled speech per his wife. He also feels numbness to right side of his tongue. His gait was also off balance. He also has some upper back pain for last 7-10 days, and he was seen here in ED on Dec 26, and he was given Methocarbamol and Diclofenac which he was taking with any relief in his upper back pain. He was seen in an office as outpatient by an Orthopedic Spine surgeon Dr. Jorden Blas, and he was referred to ED due to blurry vision and diplopia. On 1/3 he ws noted with Lyme AB detected and given doxycycline, then ceftriaxone.     1. Lyme disease. B/l facial palsy. Acute meningitis. 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  -face feels less stiff  -CSF analysis reviewed  -probable tick exposure several weeks/ months ago  -on ceftriaxone 2 gm IV qd # 5  -tolerating abx well so far; no side effects noted  -neurology evaluation appreciated  -steroids per neuro  -f/u Lyme CSF serology  -f/u cultures  -continue abx coverage  -will home IV abx coverage for 3 more weeks  -home IV abx setup in progress; case reviewed with case management; orders reviewed and called in to pharmacist with infusion company; awaiting insurance approval  -plan for midline  -monitor temps  -f/u CBC  -supportive care  2. Other issues:   -care per medicine    d/w wife and daughter at bedside  All questions answered     59 y/o Male with h/o DM-1 on insulin pump, Hypothyroidism, HTN and HPL was admitted on 1/24 for b/l weakness to right face, inability to close right eyes, intermittent double and blurry vision of right eye x one day. He also has some slurred and garbled speech per his wife. He also feels numbness to right side of his tongue. His gait was also off balance. He also has some upper back pain for last 7-10 days, and he was seen here in ED on Dec 26, and he was given Methocarbamol and Diclofenac which he was taking with any relief in his upper back pain. He was seen in an office as outpatient by an Orthopedic Spine surgeon Dr. Jorden Blas, and he was referred to ED due to blurry vision and diplopia. On 1/3 he ws noted with Lyme AB detected and given doxycycline, then ceftriaxone.     1. Lyme disease. B/l facial palsy. Acute meningitis. 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  -face feels less stiff  -CSF analysis reviewed  -probable tick exposure several weeks/ months ago  -on ceftriaxone 2 gm IV qd # 5  -tolerating abx well so far; no side effects noted  -neurology evaluation appreciated  -steroids per neuro  -f/u Lyme CSF serology  -f/u cultures  -continue abx coverage  -will home IV abx coverage for 3 more weeks  -home IV abx setup in progress; case reviewed with case management; orders reviewed and called in to pharmacist with infusion company; awaiting insurance approval  -plan for midline  -monitor temps  -f/u CBC  -supportive care  2. Other issues:   -care per medicine    d/w wife and daughter at bedside  All questions answered

## 2024-01-08 NOTE — DISCHARGE NOTE NURSING/CASE MANAGEMENT/SOCIAL WORK - NSDCVIVACCINE_GEN_ALL_CORE_FT
Tdap; 20-Dec-2020 14:18; Mauricio Gleason (RN); [employer]; [id number] (Exp. Date: 31-Jul-2022); IntraMuscular; Deltoid Right.; 0.5 milliLiter(s); VIS (VIS Published: 09-May-2013, VIS Presented: 20-Dec-2020);    Tdap; 20-Dec-2020 14:18; Mauricio Gleason (RN); [employer]; [id number] (Exp. Date: 31-Jul-2022); IntraMuscular; Deltoid Right.; 0.5 milliLiter(s); VIS (VIS Published: 09-May-2013, VIS Presented: 20-Dec-2020);

## 2024-01-08 NOTE — DISCHARGE NOTE NURSING/CASE MANAGEMENT/SOCIAL WORK - NSDCPEFALRISK_GEN_ALL_CORE
For information on Fall & Injury Prevention, visit: [email or web address] OR  [email or web address] OR  https://www.cdc.gov/steadi/patient.html For information on Fall & Injury Prevention, visit: [email or web address] OR  [email or web address] OR  https://www.cdc.gov/steadi/patient.html

## 2024-01-08 NOTE — PROGRESS NOTE ADULT - SUBJECTIVE AND OBJECTIVE BOX
Date of service: 01-08-24 @ 11:12    Lying in bed in NAD  Face is less weak  No fever    ROS: denies dizziness, no HA, no abdominal pain, no diarrhea or constipation; no dysuria, no legs pain, no rashes    MEDICATIONS  (STANDING):  aspirin enteric coated 81 milliGRAM(s) Oral daily  atorvastatin 40 milliGRAM(s) Oral at bedtime  cefTRIAXone Injectable. 2000 milliGRAM(s) IV Push <User Schedule>  dextrose 5%. 1000 milliLiter(s) (50 mL/Hr) IV Continuous <Continuous>  dextrose 5%. 1000 milliLiter(s) (100 mL/Hr) IV Continuous <Continuous>  dextrose 50% Injectable 25 Gram(s) IV Push once  dextrose 50% Injectable 12.5 Gram(s) IV Push once  dextrose 50% Injectable 25 Gram(s) IV Push once  enoxaparin Injectable 40 milliGRAM(s) SubCutaneous every 24 hours  glucagon  Injectable 1 milliGRAM(s) IntraMuscular once  insulin lispro (HumaLOG) Pump 1 Each SubCutaneous Continuous Pump  levothyroxine 50 MICROGram(s) Oral daily  lisinopril 20 milliGRAM(s) Oral daily  methylPREDNISolone sodium succinate Injectable 40 milliGRAM(s) IV Push every 12 hours  pantoprazole    Tablet 40 milliGRAM(s) Oral before breakfast    Vital Signs Last 24 Hrs  T(C): 36.7 (08 Jan 2024 10:11), Max: 37.1 (07 Jan 2024 21:43)  T(F): 98.1 (08 Jan 2024 10:11), Max: 98.7 (07 Jan 2024 21:43)  HR: 78 (08 Jan 2024 10:11) (65 - 83)  BP: 140/60 (08 Jan 2024 10:11) (124/52 - 144/61)  BP(mean): 75 (07 Jan 2024 23:52) (75 - 75)  RR: 20 (08 Jan 2024 10:11) (18 - 20)  SpO2: 99% (08 Jan 2024 10:11) (87% - 99%)    Parameters below as of 08 Jan 2024 10:11  Patient On (Oxygen Delivery Method): room air     Physical exam:    Constitutional:  No acute distress  HEENT: NC/AT, EOMI, PERRLA, conjunctivae clear; ears and nose atraumatic  b/l facial palsy  Neck: supple; thyroid not palpable  Back: no tenderness  Respiratory: respiratory effort normal; clear to auscultation  Cardiovascular: S1S2 regular, no murmurs  Abdomen: soft, not tender, not distended, positive BS  Genitourinary: no suprapubic tenderness  Lymphatic: no LN palpable  Musculoskeletal: no muscle tenderness, no joint swelling or tenderness  Extremities: no pedal edema  Neurological/ Psychiatric: AxOx3, moving all extremities  Skin: no rashes; no palpable lesions    Labs: reviewed                        13.6   10.09 )-----------( 287      ( 07 Jan 2024 08:25 )             39.4     01-07    141  |  106  |  23  ----------------------------<  178<H>  3.7   |  30  |  0.72    Ca    8.5      07 Jan 2024 08:25    C-Reactive Protein, Serum: <3 mg/L (01-03-24 @ 07:50)                        13.6   10.09 )-----------( 287      ( 07 Jan 2024 08:25 )             39.4     01-07    141  |  106  |  23  ----------------------------<  178<H>  3.7   |  30  |  0.72    Ca    8.5      07 Jan 2024 08:25    C-Reactive Protein, Serum: <3 mg/L (01-03-24 @ 07:50)                        14.5   5.61  )-----------( 283      ( 04 Jan 2024 07:54 )             42.6     01-04    138  |  104  |  12  ----------------------------<  156<H>  4.0   |  30  |  0.68    Ca    9.0      04 Jan 2024 07:54    TPro  x   /  Alb  3676  /  TBili  x   /  DBili  x   /  AST  x   /  ALT  x   /  AlkPhos  x   01-03  C-Reactive Protein, Serum: <3 mg/L (01-03-24 @ 07:50)    TPro  7.8  /  Alb  3.6  /  TBili  0.3  /  DBili  x   /  AST  28  /  ALT  35  /  AlkPhos  72  01-02     LIVER FUNCTIONS - ( 02 Jan 2024 14:38 )  Alb: 3.6 g/dL / Pro: 7.8 gm/dL / ALK PHOS: 72 U/L / ALT: 35 U/L / AST: 28 U/L / GGT: x           Lyme IG G and IgM detected   Babesia microti PCR, Bld (collected 03 Jan 2024 21:39)    Babesia microti PCR, Bld (collected 03 Jan 2024 21:39)    Culture - CSF with Gram Stain (collected 03 Jan 2024 19:00)  Source: .CSF  Gram Stain (03 Jan 2024 23:31):    polymorphonuclear leukocytes seen    No organisms seen    by cytocentrifuge  Preliminary Report (04 Jan 2024 15:53):    No growth    Culture - Urine (collected 02 Jan 2024 16:15)  Source: Clean Catch Clean Catch (Midstream)  Final Report (03 Jan 2024 14:56):    No growth    Radiology: all available radiological tests reviewed    Advanced directives addressed: full resuscitation

## 2024-01-08 NOTE — PROGRESS NOTE ADULT - REASON FOR ADMISSION
Right eye unable to close with blurry vision and diplopia.

## 2024-01-08 NOTE — DISCHARGE NOTE NURSING/CASE MANAGEMENT/SOCIAL WORK - PATIENT PORTAL LINK FT
You can access the FollowMyHealth Patient Portal offered by [facility] by registering at the following website: [email or web address]. By joining [employer]’s FollowMyHealth portal, you will also be able to view your health information using other applications (apps) compatible with our system. You can access the FollowMyHealth Patient Portal offered by [facility] by registering at the following website: [email or web address]. By joining [employer]’s FollowMyHealth portal, you will also be able to view your health information using other applications (apps) compatible with our system.

## 2024-01-08 NOTE — ADVANCED PRACTICE NURSE CONSULT - ASSESSMENT
Patient A&O X4, pleasant and cooperative. Risks and Benefits of midline catheter placement explained to patient with verbal understanding.  Gave verbal consent for procedure.  Time out and hand hygiene performed.  Site cleansed with CHG, draped in sterile dressing.  Right basilic access via ultrasound with 18g 10 cm PowerGlide Pro Midline Catheter (Lot# [id number]) inserted.  Flushes well with 20cc NS.  Brisk positive blood return not.  End cap placed, line secured to skin using statlock, biopatch and DSD.  Patient tolerated procedure well.  No chest x-ray needed to verify placement.  OK to Use. Patient A&O X4, pleasant and cooperative. Risks and Benefits of midline catheter placement explained to patient with verbal understanding.  Gave verbal consent for procedure.  Time out and hand hygiene performed.  Site cleansed with CHG, draped in sterile dressing.  Right basilic access via ultrasound with 18g 10 cm PowerGlide Pro Midline Catheter (Lot# [id number]) inserted.  Flushes well with 20cc NS.  Brisk positive blood return not.  End cap placed, line secured to skin using statlock, biopatch and DSD.  Patient tolerated procedure well.  No chest x-ray needed to verify placement.  OK to Use.

## 2024-01-08 NOTE — PROGRESS NOTE ADULT - PROVIDER SPECIALTY LIST ADULT
Hospitalist
Hospitalist
Infectious Disease
Neurology
Hospitalist
Infectious Disease
Neurology
Infectious Disease
Hospitalist
Hospitalist
Neurology
Neurology
Infectious Disease

## 2024-01-09 LAB
LYME IGG LINE BLOT INTERP.: POSITIVE
LYME IGG LINE BLOT INTERP.: POSITIVE
LYME IGM LINE BLOT INTERP.: POSITIVE
LYME IGM LINE BLOT INTERP.: POSITIVE
P18 AB. IGG: PRESENT
P18 AB. IGG: PRESENT
P23 AB. IGG: PRESENT
P23 AB. IGG: PRESENT
P23 AB. IGM: SIGNIFICANT CHANGE UP
P23 AB. IGM: SIGNIFICANT CHANGE UP
P28 AB. IGG: SIGNIFICANT CHANGE UP
P28 AB. IGG: SIGNIFICANT CHANGE UP
P30 AB. IGG: SIGNIFICANT CHANGE UP
P30 AB. IGG: SIGNIFICANT CHANGE UP
P39 AB. IGG: PRESENT
P39 AB. IGG: PRESENT
P39 AB. IGM: PRESENT
P39 AB. IGM: PRESENT
P41 AB. IGG: PRESENT
P41 AB. IGG: PRESENT
P41 AB. IGM: PRESENT
P41 AB. IGM: PRESENT
P45 AB. IGG: SIGNIFICANT CHANGE UP
P45 AB. IGG: SIGNIFICANT CHANGE UP
P58 AB. IGG: PRESENT
P58 AB. IGG: PRESENT
P66 AB. IGG: PRESENT
P66 AB. IGG: PRESENT
P93 AB. IGG: SIGNIFICANT CHANGE UP
P93 AB. IGG: SIGNIFICANT CHANGE UP

## 2024-01-09 RX ORDER — CEFTRIAXONE 500 MG/1
2 INJECTION, POWDER, FOR SOLUTION INTRAMUSCULAR; INTRAVENOUS
Qty: 0 | Refills: 0 | DISCHARGE
Start: 2024-01-09

## 2024-01-10 LAB
CSF COMMENTS: SIGNIFICANT CHANGE UP
CSF COMMENTS: SIGNIFICANT CHANGE UP
NON-GYNECOLOGICAL CYTOLOGY STUDY: SIGNIFICANT CHANGE UP
NON-GYNECOLOGICAL CYTOLOGY STUDY: SIGNIFICANT CHANGE UP
OLIGOCLONAL BANDS CSF ELPH-IMP: PRESENT
OLIGOCLONAL BANDS CSF ELPH-IMP: PRESENT
WNV IGG CSF IA-ACNC: POSITIVE
WNV IGG CSF IA-ACNC: POSITIVE
WNV IGM CSF IA-ACNC: NEGATIVE — SIGNIFICANT CHANGE UP
WNV IGM CSF IA-ACNC: NEGATIVE — SIGNIFICANT CHANGE UP

## 2024-01-13 NOTE — CDI QUERY NOTE - NSCDIOTHERTXTBX_GEN_ALL_CORE_HH
This patient was admitted with lymes meningitis and documentation reflects:    Discharge Note [facility] 1-3-24 @ 11:55 (Signed 08-Jan-2024 14:26)  # Lyme meningitis  # Lyme disease  # B/l facial palsy  # Acute meningitis    Lab data reveals:    SARS-CoV-2 Result: Detected: This Respiratory Panel uses polymerase chain reaction (PCR) to detect for  influenza A; influenza B; respiratory syncytial virus; and SARS-CoV-2.  This test was validated by [facility] and is in use under the FDA  Emergency Use Authorization (EUA) for clinical labs CLIA-certified to  perform high complexity testing. Test results should be correlated with  clinical presentation, patient history, and epidemiology. (01.08.24 @ 09:45)    Can the diagnosis of COVID-19 be clarified?  -COVID 19 ruled in.  -COVID 19 ruled out, despite positive result  -Other, please specify: This patient was admitted with lymes meningitis and documentation reflects:    Discharge Note [facility] 1-3-24 @ 11:55 (Signed 08-Jan-2024 14:26)  # Lyme meningitis  # Lyme disease  # B/l facial palsy  # Acute meningitis    Lab data reveals:    SARS-CoV-2 Result: Detected: This Respiratory Panel uses polymerase chain reaction (PCR) to detect for  influenza A; influenza B; respiratory syncytial virus; and SARS-CoV-2.  This test was validated by [facility] and is in use under the FDA  Emergency Use Authorization (EUA) for clinical labs CLIA-certified to  perform high complexity testing. Test results should be correlated with  clinical presentation, patient history, and epidemiology. (01.08.24 @ 09:45)    Can the diagnosis of COVID-19 be clarified?  -COVID 19 ruled in.  -COVID 19 ruled out, despite positive result  -Other, please specify:

## 2024-01-17 ENCOUNTER — APPOINTMENT (OUTPATIENT)
Dept: FAMILY MEDICINE | Facility: CLINIC | Age: 59
End: 2024-01-17
Payer: COMMERCIAL

## 2024-01-17 VITALS
OXYGEN SATURATION: 97 % | BODY MASS INDEX: 27.92 KG/M2 | WEIGHT: 195 LBS | HEIGHT: 70 IN | TEMPERATURE: 98.3 F | HEART RATE: 93 BPM | DIASTOLIC BLOOD PRESSURE: 60 MMHG | SYSTOLIC BLOOD PRESSURE: 150 MMHG

## 2024-01-17 DIAGNOSIS — Z78.9 OTHER SPECIFIED HEALTH STATUS: ICD-10-CM

## 2024-01-17 DIAGNOSIS — H35.00 UNSPECIFIED BACKGROUND RETINOPATHY: ICD-10-CM

## 2024-01-17 DIAGNOSIS — Z83.3 FAMILY HISTORY OF DIABETES MELLITUS: ICD-10-CM

## 2024-01-17 DIAGNOSIS — Z80.3 FAMILY HISTORY OF MALIGNANT NEOPLASM OF BREAST: ICD-10-CM

## 2024-01-17 DIAGNOSIS — Z84.2 FAMILY HISTORY OF OTHER DISEASES OF THE GENITOURINARY SYSTEM: ICD-10-CM

## 2024-01-17 PROCEDURE — 99496 TRANSJ CARE MGMT HIGH F2F 7D: CPT

## 2024-01-17 RX ORDER — CEFTRIAXONE 2 G/1
2 INJECTION, POWDER, FOR SOLUTION INTRAMUSCULAR; INTRAVENOUS
Refills: 0 | Status: ACTIVE | COMMUNITY

## 2024-01-17 RX ORDER — ASPIRIN 81 MG
81 TABLET, DELAYED RELEASE (ENTERIC COATED) ORAL
Refills: 0 | Status: ACTIVE | COMMUNITY

## 2024-01-17 RX ORDER — ATORVASTATIN CALCIUM 40 MG/1
40 TABLET, FILM COATED ORAL
Refills: 0 | Status: ACTIVE | COMMUNITY

## 2024-01-17 RX ORDER — LEVOTHYROXINE SODIUM 0.05 MG/1
50 TABLET ORAL
Refills: 0 | Status: ACTIVE | COMMUNITY

## 2024-01-17 RX ORDER — INSULIN PUMP SYRINGE, 3 ML
EACH MISCELLANEOUS
Refills: 0 | Status: ACTIVE | COMMUNITY

## 2024-01-17 RX ORDER — RAMIPRIL 5 MG/1
5 CAPSULE ORAL
Refills: 0 | Status: ACTIVE | COMMUNITY

## 2024-01-17 NOTE — PHYSICAL EXAM
[No Acute Distress] : no acute distress [Well Nourished] : well nourished [Well Developed] : well developed [Well-Appearing] : well-appearing [Normal Sclera/Conjunctiva] : normal sclera/conjunctiva [PERRL] : pupils equal round and reactive to light [EOMI] : extraocular movements intact [Normal Outer Ear/Nose] : the outer ears and nose were normal in appearance [Normal Oropharynx] : the oropharynx was normal [No JVD] : no jugular venous distention [No Lymphadenopathy] : no lymphadenopathy [Supple] : supple [Thyroid Normal, No Nodules] : the thyroid was normal and there were no nodules present [No Respiratory Distress] : no respiratory distress  [No Accessory Muscle Use] : no accessory muscle use [Clear to Auscultation] : lungs were clear to auscultation bilaterally [Normal Rate] : normal rate  [Regular Rhythm] : with a regular rhythm [Normal S1, S2] : normal S1 and S2 [No Murmur] : no murmur heard [No Carotid Bruits] : no carotid bruits [No Abdominal Bruit] : a ~M bruit was not heard ~T in the abdomen [No Varicosities] : no varicosities [Pedal Pulses Present] : the pedal pulses are present [No Edema] : there was no peripheral edema [No Palpable Aorta] : no palpable aorta [No Extremity Clubbing/Cyanosis] : no extremity clubbing/cyanosis [Soft] : abdomen soft [Non Tender] : non-tender [Non-distended] : non-distended [No Masses] : no abdominal mass palpated [No HSM] : no HSM [Normal Bowel Sounds] : normal bowel sounds [Normal Posterior Cervical Nodes] : no posterior cervical lymphadenopathy [Normal Anterior Cervical Nodes] : no anterior cervical lymphadenopathy [No CVA Tenderness] : no CVA  tenderness [No Spinal Tenderness] : no spinal tenderness [No Joint Swelling] : no joint swelling [Grossly Normal Strength/Tone] : grossly normal strength/tone [No Rash] : no rash [Coordination Grossly Intact] : coordination grossly intact [Normal Gait] : normal gait [Deep Tendon Reflexes (DTR)] : deep tendon reflexes were 2+ and symmetric [Normal Affect] : the affect was normal [Normal Insight/Judgement] : insight and judgment were intact [de-identified] : mild right facial weakness

## 2024-01-17 NOTE — HISTORY OF PRESENT ILLNESS
[FreeTextEntry8] : Pt is here for a hospital follow up. 6 days at the hospital  had severe back pain.  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 CT and MRI no stroke. Echo was nromal. LP which confirmed lyme Given  Given IV Doxy and now on Ceftriaxone IV.  Has a midline catheter Daughter who is a nurse giving antibiotics daily. Home care nursing coming in weekly to check on the IV. Had been treated with steroids which helped but now that off of the steroids a little worse. Has f/u appt with Dr. Ferraro

## 2024-01-17 NOTE — HEALTH RISK ASSESSMENT
[1 or 2 (0 pts)] : 1 or 2 (0 points) [Never (0 pts)] : Never (0 points) [No] : In the past 12 months have you used drugs other than those required for medical reasons? No [No falls in past year] : Patient reported no falls in the past year [0] : 2) Feeling down, depressed, or hopeless: Not at all (0) [PHQ-2 Negative - No further assessment needed] : PHQ-2 Negative - No further assessment needed [Never] : Never [Audit-CScore] : 0 [[id number]] : 0

## 2024-01-18 ENCOUNTER — NON-APPOINTMENT (OUTPATIENT)
Age: 59
End: 2024-01-18

## 2024-01-22 ENCOUNTER — APPOINTMENT (OUTPATIENT)
Dept: NEUROLOGY | Facility: CLINIC | Age: 59
End: 2024-01-22
Payer: COMMERCIAL

## 2024-01-22 VITALS
SYSTOLIC BLOOD PRESSURE: 130 MMHG | HEART RATE: 86 BPM | DIASTOLIC BLOOD PRESSURE: 78 MMHG | WEIGHT: 195 LBS | HEIGHT: 70 IN | TEMPERATURE: 98.2 F | BODY MASS INDEX: 27.92 KG/M2

## 2024-01-22 PROCEDURE — 99214 OFFICE O/P EST MOD 30 MIN: CPT

## 2024-01-22 NOTE — PHYSICAL EXAM
[FreeTextEntry1] : Examination: Constitutional: normal, no apparent distress Eyes: normal conjunctiva b/l, no ptosis, visual fields full Respiratory: no respiratory distress, normal effort, normal auscultation Cardiovascular: normal rate, rhythm, no murmurs Neck: supple, no masses Vascular: carotids normal Skin: normal color, no rashes Psych: normal mood, affect  Neurological: Memory: normal memory, oriented to person, place, time Language intact/no aphasia Cranial Nerves: Pupils equally round and reactive to light, ocular muscles/movements intact, no ptosis, tongue protrudes normally in the midline, Left facial weakness - lower more than upper. Able to partially raise forehead Motor: normal tone, no pronator drift, full strength in all four extremities in the proximal and distal muscle groups Coordination: Fine motor movements intact, rapid alternating movements intact, finger to nose intact bilaterally Sensory: intact to light touch DTRs: symmetric, 1+ in b/l biceps, radialis, patellars, absent ankle reflexes Gait: narrow based, steady

## 2024-01-22 NOTE — DATA REVIEWED
[de-identified] :  MRI head non-contrast 1/2/24: No acute intracranial hemorrhage or evidence of acute  ischemia.  MR cervical spine 1/3/24:  1. Straightening of lordosis can be seen in the setting of muscle spasm. 2. C3-C4 and C4-C5 disc bulges, resulting in mild left neural foramen  stenosis with uncovertebral spurring. 3. C5-C6 right paracentral disc protrusion superimposed upon a disc  bulge, impinging upon the thecal sac, resulting in mild central canal and  mild left neural foramen stenosis with uncovertebral spurring. 4. No cord compression or gross altered cord signal. 5. Additional findings described in detail above.  MRI brain with contrast 1/3/24: Study is a foreshortened examination. Enhancement of the distal canalicular segments and faint enhancement of  the mastoid and extraocular temporal segments of the bilateral facial  nerves. Note that in some instances, enhancement of the facial nerves can  be within normal limits. However, given patient's symptoms, an  inflammatory process is suspected.  [de-identified] : CTA head and neck 1/2/24: CTA BRAIN: Atheromatous irregularity along the cavernous ICAs  bilaterally. Otherwise, no flow-limiting stenosis or occlusion.  CTANECK: Patent cervical vasculature. No flow limiting stenosis or  occlusion.   Lyme: Lyme IgM Ab: 0.935 Index (equivocal) Lyme IgG > 45 (positive) Lyme IgG/IgM Abs 8.93 Lyme C6 interpretation (positive)  CSF 1/3/24: nucleated cells 255, 75% lymphocytes protein 119 Glucose 107 PCR not detected  Oligoclonal bands: The patients CSF contains GREATER THAN FIVE well defined gamma restriction bands that are not present in the corresponding serum sample.  Also noted are additional identical (mirror image) gamma restriction bands in both the patients CSF and serum. Thesefindings are indicative of intra-cerebral as well as systemic synthesis of gamma globulins and may beassociated with Guillain-Elon syndrome, peripheral neuropathy or increased blood-brain barrier permeability.   CSF VDRL - non-reactive CSF ACE < 1.5  CSF lyme: positive CSF West Nile - + IgG, -IgM  MARIO: 1:320, speckled

## 2024-01-22 NOTE — HISTORY OF PRESENT ILLNESS
[FreeTextEntry1] : 1/22/24: Mr. Miranda is here for follow-up after hospitalization. He is accompanied by his daughter. He was admitted to [facility] on 1/2/24. He is a 58y Male with significant PMH of DM-1 on insulin pump, Hypothyroidism, HTN and HLD who presented with right facial weakness and perioral numbness. He then developed left sided facial weakness.  He had previously been seen in the ED on 12/26 for upper back pain.   MRI brain without contrast was negative for ischemia. An LP was performed which showed elevated protein and 225 WBC. A repeat MRI brain with contrast showed enhancement of the b/l facial nerves. Serum was positive for Lyme, IgG>IgM. He was initially given doxycycline and then changed to IV ceftriaxone for lyme meningitis and started on steroids.  He has one week left of his antibiotic treatment.  He continued a steroid taper. He has regained some motor function in the right side of his face. The left side is still weaker. He has some dull headache in the back of his head bilaterally and described as a "low throb". He is swallowing well. He mostly chews on the right side.  Diplopia has mostly resolved but sometimes vision is fuzzy. Sometimes it takes an extra second to get his eyes into focus.   He did test positive for covid on the last day of his hospitalization.

## 2024-01-22 NOTE — DISCUSSION/SUMMARY
[FreeTextEntry1] : Mr. Miranda is a 58y man with diabetes type 1 with an insulin pump, HTN, HLD, hypothyroidism, who was admitted to [facility] [place] on 1/2/24 with right facial weakness and perioral numbness who then developed left sided weakness as well. MRI brain showed no ischemia. Serum and CSF were positive for lyme and MRI brain with contrast showed enhancement of the b/l facial nerves. He was treated with IV ceftriaxone and steroids.  B/L facial weakness secondary to lyme meningitis: -He has had improvement in the right side of his face and mild improvement on the left. -Complete course of ceftriaxone, one additional week -If no improvement in left facial weakness in one month, will repeat MRI brain and if there is still significant inflammation of the left facial nerve, can consider another course of steroids. -Referral given for facial therapy -Continue to use an eye mask/patch at night while left eye is not completely closing. Eye drops and ointment for dry eye. -Management of blood sugar.  He will f/u as needed.

## 2024-02-06 ENCOUNTER — APPOINTMENT (OUTPATIENT)
Dept: FAMILY MEDICINE | Facility: CLINIC | Age: 59
End: 2024-02-06
Payer: COMMERCIAL

## 2024-02-06 VITALS
BODY MASS INDEX: 27.98 KG/M2 | TEMPERATURE: 98 F | DIASTOLIC BLOOD PRESSURE: 68 MMHG | OXYGEN SATURATION: 97 % | WEIGHT: 195 LBS | SYSTOLIC BLOOD PRESSURE: 124 MMHG | HEART RATE: 98 BPM

## 2024-02-06 DIAGNOSIS — R29.810 FACIAL WEAKNESS: ICD-10-CM

## 2024-02-06 PROCEDURE — 99214 OFFICE O/P EST MOD 30 MIN: CPT

## 2024-02-06 NOTE — HISTORY OF PRESENT ILLNESS
[FreeTextEntry6] : pt is here for f/u HA's have improved. No rashes. No pain. Sesser improved. Finished steroids. Finished antibiotics. Midline removed No joint pain.

## 2024-02-06 NOTE — PLAN
[FreeTextEntry1] : Advised at length Continue present care F/u neurology prn F/u with endocrinology

## 2024-04-01 ENCOUNTER — APPOINTMENT (OUTPATIENT)
Dept: FAMILY MEDICINE | Facility: CLINIC | Age: 59
End: 2024-04-01
Payer: COMMERCIAL

## 2024-04-01 VITALS
SYSTOLIC BLOOD PRESSURE: 134 MMHG | HEART RATE: 76 BPM | BODY MASS INDEX: 27.63 KG/M2 | TEMPERATURE: 98 F | WEIGHT: 193 LBS | OXYGEN SATURATION: 98 % | HEIGHT: 70 IN | DIASTOLIC BLOOD PRESSURE: 62 MMHG

## 2024-04-01 DIAGNOSIS — Z00.00 ENCOUNTER FOR GENERAL ADULT MEDICAL EXAMINATION W/OUT ABNORMAL FINDINGS: ICD-10-CM

## 2024-04-01 DIAGNOSIS — E10.9 TYPE 1 DIABETES MELLITUS W/OUT COMPLICATIONS: ICD-10-CM

## 2024-04-01 DIAGNOSIS — E78.5 HYPERLIPIDEMIA, UNSPECIFIED: ICD-10-CM

## 2024-04-01 DIAGNOSIS — A69.21 MENINGITIS DUE TO LYME DISEASE: ICD-10-CM

## 2024-04-01 DIAGNOSIS — Z86.03 PERSONAL HISTORY OF NEOPLASM OF UNCERTAIN BEHAVIOR: ICD-10-CM

## 2024-04-01 PROCEDURE — 93000 ELECTROCARDIOGRAM COMPLETE: CPT

## 2024-04-01 PROCEDURE — 99173 VISUAL ACUITY SCREEN: CPT

## 2024-04-01 PROCEDURE — 36415 COLL VENOUS BLD VENIPUNCTURE: CPT

## 2024-04-01 PROCEDURE — 99396 PREV VISIT EST AGE 40-64: CPT

## 2024-04-01 PROCEDURE — 92551 PURE TONE HEARING TEST AIR: CPT

## 2024-04-01 PROCEDURE — 81003 URINALYSIS AUTO W/O SCOPE: CPT | Mod: QW

## 2024-04-01 NOTE — HEALTH RISK ASSESSMENT
[Yes] : Yes [Monthly or less (1 pt)] : Monthly or less (1 point) [1 or 2 (0 pts)] : 1 or 2 (0 points) [Never (0 pts)] : Never (0 points) [No] : In the past 12 months have you used drugs other than those required for medical reasons? No [No falls in past year] : Patient reported no falls in the past year [0] : 2) Feeling down, depressed, or hopeless: Not at all (0) [PHQ-2 Negative - No further assessment needed] : PHQ-2 Negative - No further assessment needed [HIV test declined] : HIV test declined [Hepatitis C test declined] : Hepatitis C test declined [With Family] : lives with family [# of Members in Household ___] :  household currently consist of [unfilled] member(s) [Employed] : employed [College] : College [] :  [# Of Children ___] : has [unfilled] children [Feels Safe at Home] : Feels safe at home [Fully functional (bathing, dressing, toileting, transferring, walking, feeding)] : Fully functional (bathing, dressing, toileting, transferring, walking, feeding) [Fully functional (using the telephone, shopping, preparing meals, housekeeping, doing laundry, using] : Fully functional and needs no help or supervision to perform IADLs (using the telephone, shopping, preparing meals, housekeeping, doing laundry, using transportation, managing medications and managing finances) [Reports normal functional visual acuity (ie: able to read med bottle)] : Reports normal functional visual acuity [Smoke Detector] : smoke detector [Carbon Monoxide Detector] : carbon monoxide detector [Seat Belt] :  uses seat belt [Sunscreen] : uses sunscreen [Travel to Developing Areas] : travel to developing areas [Never] : Never [Very Good] : ~his/her~  mood as very good [Audit-CScore] : 1 [[id number]] : 0 [Retinopathy] : Retinopathy [Change in mental status noted] : No change in mental status noted [EyeExamDate] : 12/23 [Language] : denies difficulty with language [Learning/Retaining New Information] : denies difficulty learning/retaining new information [Behavior] : denies difficulty with behavior [Handling Complex Tasks] : denies difficulty handling complex tasks [Reasoning] : denies difficulty with reasoning [Spatial Ability and Orientation] : denies difficulty with spatial ability and orientation [Reports changes in hearing] : Reports no changes in hearing [Reports changes in vision] : Reports no changes in vision [Reports changes in dental health] : Reports no changes in dental health [TB Exposure] : is not being exposed to tuberculosis [Guns at Home] : no guns at home

## 2024-04-01 NOTE — PHYSICAL EXAM
[20/___] : left eye 20/[unfilled] [Well Nourished] : well nourished [No Acute Distress] : no acute distress [Well Developed] : well developed [Normal Sclera/Conjunctiva] : normal sclera/conjunctiva [Well-Appearing] : well-appearing [PERRL] : pupils equal round and reactive to light [EOMI] : extraocular movements intact [Normal Outer Ear/Nose] : the outer ears and nose were normal in appearance [Normal Oropharynx] : the oropharynx was normal [No JVD] : no jugular venous distention [No Lymphadenopathy] : no lymphadenopathy [Supple] : supple [Thyroid Normal, No Nodules] : the thyroid was normal and there were no nodules present [No Respiratory Distress] : no respiratory distress  [No Accessory Muscle Use] : no accessory muscle use [Clear to Auscultation] : lungs were clear to auscultation bilaterally [Normal Rate] : normal rate  [Regular Rhythm] : with a regular rhythm [Normal S1, S2] : normal S1 and S2 [No Murmur] : no murmur heard [No Carotid Bruits] : no carotid bruits [No Abdominal Bruit] : a ~M bruit was not heard ~T in the abdomen [Pedal Pulses Present] : the pedal pulses are present [No Varicosities] : no varicosities [No Edema] : there was no peripheral edema [No Palpable Aorta] : no palpable aorta [No Extremity Clubbing/Cyanosis] : no extremity clubbing/cyanosis [Soft] : abdomen soft [Non Tender] : non-tender [No Masses] : no abdominal mass palpated [Non-distended] : non-distended [Normal Bowel Sounds] : normal bowel sounds [No HSM] : no HSM [Normal Anterior Cervical Nodes] : no anterior cervical lymphadenopathy [Normal Posterior Cervical Nodes] : no posterior cervical lymphadenopathy [No CVA Tenderness] : no CVA  tenderness [No Spinal Tenderness] : no spinal tenderness [Grossly Normal Strength/Tone] : grossly normal strength/tone [No Joint Swelling] : no joint swelling [No Rash] : no rash [No Focal Deficits] : no focal deficits [Coordination Grossly Intact] : coordination grossly intact [Normal Gait] : normal gait [Deep Tendon Reflexes (DTR)] : deep tendon reflexes were 2+ and symmetric [Normal Affect] : the affect was normal [Normal Insight/Judgement] : insight and judgment were intact [Comprehensive Foot Exam Normal] : Right and left foot were examined and both feet are normal. No ulcers in either foot. Toes are normal and with full ROM.  Normal tactile sensation with monofilament testing throughout both feet [FreeTextEntry1] : Right 0.5-35 1-30 2-25 4-30   Left 0.5-30 1-35 2-25 4- 0

## 2024-04-02 LAB
ALBUMIN SERPL ELPH-MCNC: 4.3 G/DL
ALP BLD-CCNC: 88 U/L
ALT SERPL-CCNC: 22 U/L
ANION GAP SERPL CALC-SCNC: 11 MMOL/L
AST SERPL-CCNC: 24 U/L
BASOPHILS # BLD AUTO: 0.07 K/UL
BASOPHILS NFR BLD AUTO: 1.1 %
BILIRUB SERPL-MCNC: 0.4 MG/DL
BUN SERPL-MCNC: 10 MG/DL
CALCIUM SERPL-MCNC: 9.4 MG/DL
CHLORIDE SERPL-SCNC: 104 MMOL/L
CHOLEST SERPL-MCNC: 132 MG/DL
CO2 SERPL-SCNC: 28 MMOL/L
CREAT SERPL-MCNC: 0.75 MG/DL
CREAT SPEC-SCNC: 104 MG/DL
EGFR: 104 ML/MIN/1.73M2
EOSINOPHIL # BLD AUTO: 0.18 K/UL
EOSINOPHIL NFR BLD AUTO: 2.9 %
GLUCOSE SERPL-MCNC: 134 MG/DL
HCT VFR BLD CALC: 47.9 %
HDLC SERPL-MCNC: 39 MG/DL
HGB BLD-MCNC: 15.7 G/DL
IMM GRANULOCYTES NFR BLD AUTO: 0.7 %
LDLC SERPL CALC-MCNC: 78 MG/DL
LYMPHOCYTES # BLD AUTO: 1.97 K/UL
LYMPHOCYTES NFR BLD AUTO: 32.1 %
MAN DIFF?: NORMAL
MCHC RBC-ENTMCNC: 30.4 PG
MCHC RBC-ENTMCNC: 32.8 GM/DL
MCV RBC AUTO: 92.8 FL
MICROALBUMIN 24H UR DL<=1MG/L-MCNC: <1.2 MG/DL
MICROALBUMIN/CREAT 24H UR-RTO: NORMAL MG/G
MONOCYTES # BLD AUTO: 0.66 K/UL
MONOCYTES NFR BLD AUTO: 10.7 %
NEUTROPHILS # BLD AUTO: 3.22 K/UL
NEUTROPHILS NFR BLD AUTO: 52.5 %
NONHDLC SERPL-MCNC: 92 MG/DL
PLATELET # BLD AUTO: 264 K/UL
POTASSIUM SERPL-SCNC: 4.5 MMOL/L
PROT SERPL-MCNC: 7.3 G/DL
PSA FREE FLD-MCNC: 35 %
PSA FREE SERPL-MCNC: 0.14 NG/ML
PSA SERPL-MCNC: 0.4 NG/ML
RBC # BLD: 5.16 M/UL
RBC # FLD: 14.2 %
SODIUM SERPL-SCNC: 142 MMOL/L
T3FREE SERPL-MCNC: 3.16 PG/ML
T4 FREE SERPL-MCNC: 1.2 NG/DL
TRIGL SERPL-MCNC: 72 MG/DL
TSH SERPL-ACNC: 2.46 UIU/ML
WBC # FLD AUTO: 6.14 K/UL

## 2024-04-15 LAB
BILIRUB UR QL STRIP: NORMAL
GLUCOSE UR-MCNC: NORMAL
HCG UR QL: 0.2 EU/DL
HGB UR QL STRIP.AUTO: NORMAL
KETONES UR-MCNC: NORMAL
LEUKOCYTE ESTERASE UR QL STRIP: NORMAL
NITRITE UR QL STRIP: NORMAL
PH UR STRIP: 8
PROT UR STRIP-MCNC: NORMAL
SP GR UR STRIP: 1.02

## 2024-04-16 ENCOUNTER — APPOINTMENT (OUTPATIENT)
Dept: GASTROENTEROLOGY | Facility: CLINIC | Age: 59
End: 2024-04-16
Payer: COMMERCIAL

## 2024-04-16 VITALS
DIASTOLIC BLOOD PRESSURE: 66 MMHG | HEIGHT: 70 IN | SYSTOLIC BLOOD PRESSURE: 136 MMHG | BODY MASS INDEX: 27.92 KG/M2 | WEIGHT: 195 LBS

## 2024-04-16 DIAGNOSIS — Z12.11 ENCOUNTER FOR SCREENING FOR MALIGNANT NEOPLASM OF COLON: ICD-10-CM

## 2024-04-16 DIAGNOSIS — R13.10 DYSPHAGIA, UNSPECIFIED: ICD-10-CM

## 2024-04-16 PROCEDURE — 99204 OFFICE O/P NEW MOD 45 MIN: CPT

## 2024-04-16 RX ORDER — SODIUM SULFATE, POTASSIUM SULFATE AND MAGNESIUM SULFATE 1.6; 3.13; 17.5 G/177ML; G/177ML; G/177ML
17.5-3.13-1.6 SOLUTION ORAL
Qty: 1 | Refills: 0 | Status: ACTIVE | COMMUNITY
Start: 2024-04-16 | End: 1900-01-01

## 2024-04-16 NOTE — HISTORY OF PRESENT ILLNESS
[FreeTextEntry1] : Misael Miranda is a 58 year old male PMH type 1 diabetes mellitus on insulin pump presents today for consult for screening colonoscopy. Denies FMH of CRC. Denies bowel complaints, typically has bowel movements regularly without significant straining or overt bleeding such as melena or hematochezia. Does note some intermittent dysphagia with particularly dry foods however denies GERD, nausea, vomiting. Denies unintentional weight loss.

## 2024-04-16 NOTE — PHYSICAL EXAM
[Alert] : alert [Healthy Appearing] : healthy appearing [Sclera] : the sclera and conjunctiva were normal [Hearing Threshold Finger Rub Not [place]] : hearing was normal [Normal Appearance] : the appearance of the neck was normal [No Respiratory Distress] : no respiratory distress [Auscultation Breath Sounds / Voice Sounds] : lungs were clear to auscultation bilaterally [Heart Rate And Rhythm] : heart rate was normal and rhythm regular [Bowel Sounds] : normal bowel sounds [Abdomen Tenderness] : non-tender [Abdomen Soft] : soft [Abnormal Walk] : normal gait [Normal Color / Pigmentation] : normal skin color and pigmentation [Oriented To Time, Place, And Person] : oriented to person, place, and time

## 2024-04-16 NOTE — ASSESSMENT
[FreeTextEntry1] : Plan: Since pt has never had colonoscopy, would recommend. Also discussed EGD since pt reports intermittent dysphagia. Risks versus benefits as well as instructions reviewed, pt agrees to planned procedure. All questions answered.

## 2024-04-24 ENCOUNTER — TRANSCRIPTION ENCOUNTER (OUTPATIENT)
Age: 59
End: 2024-04-24

## 2024-04-24 LAB
ESTIMATED AVERAGE GLUCOSE: 174 MG/DL
HBA1C MFR BLD HPLC: 7.7 %

## 2024-05-29 ENCOUNTER — APPOINTMENT (OUTPATIENT)
Dept: GASTROENTEROLOGY | Facility: AMBULATORY MEDICAL SERVICES | Age: 59
End: 2024-05-29
Payer: COMMERCIAL

## 2024-05-29 ENCOUNTER — RESULT REVIEW (OUTPATIENT)
Age: 59
End: 2024-05-29

## 2024-05-29 DIAGNOSIS — K22.10 ULCER OF ESOPHAGUS W/OUT BLEEDING: ICD-10-CM

## 2024-05-29 PROCEDURE — 45378 DIAGNOSTIC COLONOSCOPY: CPT | Mod: PT

## 2024-05-29 PROCEDURE — 43239 EGD BIOPSY SINGLE/MULTIPLE: CPT | Mod: 59

## 2024-05-29 RX ORDER — OMEPRAZOLE 40 MG/1
40 CAPSULE, DELAYED RELEASE ORAL
Qty: 30 | Refills: 5 | Status: ACTIVE | COMMUNITY
Start: 2024-05-29 | End: 1900-01-01

## 2024-05-30 ENCOUNTER — RX ONLY (RX ONLY)
Age: 59
End: 2024-05-30

## 2024-05-30 ENCOUNTER — OFFICE (OUTPATIENT)
Dept: URBAN - METROPOLITAN AREA CLINIC 102 | Facility: CLINIC | Age: 59
Setting detail: OPHTHALMOLOGY
End: 2024-05-30
Payer: COMMERCIAL

## 2024-05-30 DIAGNOSIS — Z96.1: ICD-10-CM

## 2024-05-30 DIAGNOSIS — H18.832: ICD-10-CM

## 2024-05-30 DIAGNOSIS — E10.3513: ICD-10-CM

## 2024-05-30 DIAGNOSIS — H16.223: ICD-10-CM

## 2024-05-30 DIAGNOSIS — H26.493: ICD-10-CM

## 2024-05-30 DIAGNOSIS — G51.0: ICD-10-CM

## 2024-05-30 PROCEDURE — 92014 COMPRE OPH EXAM EST PT 1/>: CPT | Performed by: OPHTHALMOLOGY

## 2024-05-30 PROCEDURE — 92134 CPTRZ OPH DX IMG PST SGM RTA: CPT | Performed by: OPHTHALMOLOGY

## 2024-05-30 ASSESSMENT — CONFRONTATIONAL VISUAL FIELD TEST (CVF)
OD_FINDINGS: FULL
OS_FINDINGS: FULL

## 2024-10-10 ENCOUNTER — APPOINTMENT (OUTPATIENT)
Dept: GASTROENTEROLOGY | Facility: CLINIC | Age: 59
End: 2024-10-10
Payer: COMMERCIAL

## 2024-10-10 VITALS
SYSTOLIC BLOOD PRESSURE: 142 MMHG | HEIGHT: 70 IN | DIASTOLIC BLOOD PRESSURE: 70 MMHG | WEIGHT: 190 LBS | BODY MASS INDEX: 27.2 KG/M2

## 2024-10-10 DIAGNOSIS — Z09 ENCOUNTER FOR FOLLOW-UP EXAMINATION AFTER COMPLETED TREATMENT FOR CONDITIONS OTHER THAN MALIGNANT NEOPLASM: ICD-10-CM

## 2024-10-10 DIAGNOSIS — K21.9 GASTRO-ESOPHAGEAL REFLUX DISEASE W/OUT ESOPHAGITIS: ICD-10-CM

## 2024-10-10 PROCEDURE — 99213 OFFICE O/P EST LOW 20 MIN: CPT

## 2024-10-31 ENCOUNTER — RX RENEWAL (OUTPATIENT)
Age: 59
End: 2024-10-31

## 2025-04-10 NOTE — PATIENT PROFILE ADULT - TRANSPORTATION
"Chief Complaint   Patient presents with    RECHECK       Kidney replaced by transplant  2/2/2024    Chronic kidney disease, stage 2 (mild)      Swelling in legs during the day          BP (!) 151/80 (BP Location: Left arm, Patient Position: Sitting)   Pulse 68   Ht 1.75 m (5' 8.9\")   Wt 100.7 kg (222 lb)   SpO2 96%   BMI 32.88 kg/m    Lobo Darden CMA on 4/10/2025 at 12:24 PM    "
no

## 2025-04-12 ENCOUNTER — RX RENEWAL (OUTPATIENT)
Age: 60
End: 2025-04-12

## 2025-06-05 NOTE — PROCEDURE NOTE - NSTIMEOUT_GEN_A_CORE
No
Patient's first and last name, , procedure, and correct site confirmed prior to the start of procedure.

## 2025-06-06 ENCOUNTER — OFFICE (OUTPATIENT)
Dept: URBAN - METROPOLITAN AREA CLINIC 102 | Facility: CLINIC | Age: 60
Setting detail: OPHTHALMOLOGY
End: 2025-06-06
Payer: COMMERCIAL

## 2025-06-06 DIAGNOSIS — E10.3292: ICD-10-CM

## 2025-06-06 DIAGNOSIS — H18.832: ICD-10-CM

## 2025-06-06 DIAGNOSIS — E10.3211: ICD-10-CM

## 2025-06-06 DIAGNOSIS — H26.493: ICD-10-CM

## 2025-06-06 PROCEDURE — 92250 FUNDUS PHOTOGRAPHY W/I&R: CPT | Performed by: OPHTHALMOLOGY

## 2025-06-06 PROCEDURE — 99214 OFFICE O/P EST MOD 30 MIN: CPT | Performed by: OPHTHALMOLOGY

## 2025-06-06 ASSESSMENT — KERATOMETRY
OD_AXISANGLE_DEGREES: 008
OD_K1POWER_DIOPTERS: 43.25
OS_K2POWER_DIOPTERS: 45.00
OS_K1POWER_DIOPTERS: 43.75
OD_K2POWER_DIOPTERS: 45.00
OS_AXISANGLE_DEGREES: 168

## 2025-06-06 ASSESSMENT — REFRACTION_MANIFEST
OS_AXIS: 099
OD_AXIS: 081
OD_SPHERE: +0.75
OS_CYLINDER: -1.25
OD_CYLINDER: -1.25
OS_SPHERE: -0.50
OS_VA1: 20/40

## 2025-06-06 ASSESSMENT — VISUAL ACUITY
OD_BCVA: 20/30
OS_BCVA: 20/30-1

## 2025-06-06 ASSESSMENT — REFRACTION_CURRENTRX
OD_VPRISM_DIRECTION: SV
OS_SPHERE: +2.00
OD_AXIS: 0
OD_OVR_VA: 20/
OD_CYLINDER: SPHERE
OS_VPRISM_DIRECTION: SV
OS_AXIS: 0
OD_SPHERE: +2.00
OS_CYLINDER: SPHERE
OS_OVR_VA: 20/

## 2025-06-06 ASSESSMENT — REFRACTION_AUTOREFRACTION
OD_AXIS: 83
OS_SPHERE: +0.25
OD_SPHERE: +1.25
OS_CYLINDER: -0.75
OD_CYLINDER: -1.50
OS_AXIS: 100

## 2025-06-06 ASSESSMENT — CONFRONTATIONAL VISUAL FIELD TEST (CVF)
OS_FINDINGS: FULL
OD_FINDINGS: FULL

## 2025-06-06 ASSESSMENT — TONOMETRY
OD_IOP_MMHG: 12
OS_IOP_MMHG: 10